# Patient Record
Sex: FEMALE | Race: BLACK OR AFRICAN AMERICAN | Employment: OTHER | URBAN - METROPOLITAN AREA
[De-identification: names, ages, dates, MRNs, and addresses within clinical notes are randomized per-mention and may not be internally consistent; named-entity substitution may affect disease eponyms.]

---

## 2017-04-18 RX ORDER — ASPIRIN 81 MG/1
81 TABLET ORAL DAILY
Qty: 30 TABLET | Refills: 11 | Status: SHIPPED | OUTPATIENT
Start: 2017-04-18 | End: 2017-11-02 | Stop reason: SDUPTHER

## 2017-04-19 RX ORDER — ASPIRIN 81 MG/1
81 TABLET ORAL DAILY
Qty: 30 TABLET | Refills: 11 | Status: SHIPPED | OUTPATIENT
Start: 2017-04-19 | End: 2018-05-14 | Stop reason: SDUPTHER

## 2017-04-19 RX ORDER — CLOPIDOGREL BISULFATE 75 MG/1
75 TABLET ORAL DAILY
Qty: 30 TABLET | Refills: 11 | Status: SHIPPED | OUTPATIENT
Start: 2017-04-19 | End: 2019-08-09 | Stop reason: SDUPTHER

## 2017-04-27 RX ORDER — ASPIRIN 81 MG
TABLET, DELAYED RELEASE (ENTERIC COATED) ORAL
Qty: 30 TABLET | Refills: 0 | Status: SHIPPED | OUTPATIENT
Start: 2017-04-27 | End: 2018-07-10 | Stop reason: SDUPTHER

## 2017-04-27 RX ORDER — CLOPIDOGREL BISULFATE 75 MG/1
75 TABLET ORAL DAILY
Qty: 90 TABLET | Refills: 3 | Status: SHIPPED | OUTPATIENT
Start: 2017-04-27 | End: 2017-11-02 | Stop reason: SDUPTHER

## 2017-10-30 RX ORDER — AMLODIPINE BESYLATE 2.5 MG/1
2.5 TABLET ORAL DAILY
Qty: 90 TABLET | Refills: 3 | Status: SHIPPED | OUTPATIENT
Start: 2017-10-30 | End: 2022-03-03

## 2017-11-02 ENCOUNTER — OFFICE VISIT (OUTPATIENT)
Dept: CARDIOLOGY CLINIC | Age: 82
End: 2017-11-02

## 2017-11-02 VITALS
OXYGEN SATURATION: 95 % | WEIGHT: 210 LBS | HEIGHT: 67 IN | SYSTOLIC BLOOD PRESSURE: 124 MMHG | HEART RATE: 70 BPM | BODY MASS INDEX: 32.96 KG/M2 | DIASTOLIC BLOOD PRESSURE: 74 MMHG

## 2017-11-02 DIAGNOSIS — I25.10 CORONARY ARTERY DISEASE INVOLVING NATIVE CORONARY ARTERY OF NATIVE HEART WITHOUT ANGINA PECTORIS: Primary | ICD-10-CM

## 2017-11-02 PROCEDURE — 1090F PRES/ABSN URINE INCON ASSESS: CPT | Performed by: INTERNAL MEDICINE

## 2017-11-02 PROCEDURE — G8484 FLU IMMUNIZE NO ADMIN: HCPCS | Performed by: INTERNAL MEDICINE

## 2017-11-02 PROCEDURE — G8417 CALC BMI ABV UP PARAM F/U: HCPCS | Performed by: INTERNAL MEDICINE

## 2017-11-02 PROCEDURE — 99214 OFFICE O/P EST MOD 30 MIN: CPT | Performed by: INTERNAL MEDICINE

## 2017-11-02 PROCEDURE — G8598 ASA/ANTIPLAT THER USED: HCPCS | Performed by: INTERNAL MEDICINE

## 2017-11-02 PROCEDURE — 1123F ACP DISCUSS/DSCN MKR DOCD: CPT | Performed by: INTERNAL MEDICINE

## 2017-11-02 PROCEDURE — 1036F TOBACCO NON-USER: CPT | Performed by: INTERNAL MEDICINE

## 2017-11-02 PROCEDURE — 4040F PNEUMOC VAC/ADMIN/RCVD: CPT | Performed by: INTERNAL MEDICINE

## 2017-11-02 PROCEDURE — G8427 DOCREV CUR MEDS BY ELIG CLIN: HCPCS | Performed by: INTERNAL MEDICINE

## 2017-11-02 RX ORDER — ACARBOSE 25 MG/1
25 TABLET ORAL 2 TIMES DAILY
COMMUNITY

## 2017-11-02 RX ORDER — SIMVASTATIN 20 MG
20 TABLET ORAL NIGHTLY
Qty: 90 TABLET | Refills: 3 | Status: SHIPPED | OUTPATIENT
Start: 2017-11-02 | End: 2022-03-03

## 2017-11-02 NOTE — PROGRESS NOTES
Shadia Estes MD        OFFICE  FOLLOWUP NOTE    Chief complaints: patient is here for management of CAD, HTN, DM, DYSLIPIDEMIA    Subjective: patient feels better, no chest pain, no shortness of breath, no dizziness, no palpitations    HARJINDER Diaz is a 80 y. o.year old who  has a past medical history of Allergic rhinitis; CAD (coronary artery disease); Chronic nausea; GERD (gastroesophageal reflux disease); H/O cardiovascular stress test; H/O cardiovascular stress test; H/O cardiovascular stress test; H/O echocardiogram; H/O percutaneous left heart catheterization; H/O right coronary artery stent placement; History of echocardiogram; Hypertension; Intermittent palpitations; Need for pneumococcal vaccine; Obesity (BMI 30-39.9); Other screening mammogram; and Type II or unspecified type diabetes mellitus without mention of complication, not stated as uncontrolled. and presents for management of CAD, HTN, DM, DYSLIPIDEMIA which are well controlled, she had mid LAD PCI few yrs ago, doing fine. Current Outpatient Prescriptions   Medication Sig Dispense Refill    acarbose (PRECOSE) 25 MG tablet Take 25 mg by mouth 2 times daily      amLODIPine (NORVASC) 2.5 MG tablet Take 1 tablet by mouth daily 90 tablet 3    ASPIRIN LOW DOSE 81 MG EC tablet TAKE 1 TABLET BY MOUTH DAILY 30 tablet 0    clopidogrel (PLAVIX) 75 MG tablet Take 1 tablet by mouth daily 30 tablet 11    aspirin (ASPIRIN LOW DOSE) 81 MG EC tablet Take 1 tablet by mouth daily 30 tablet 11    metoprolol tartrate (LOPRESSOR) 25 MG tablet Take 0.25 tablets by mouth 2 times daily 45 tablet 3    simvastatin (ZOCOR) 20 MG tablet Take 1 tablet by mouth nightly 90 tablet 3    Pantoprazole Sodium (PROTONIX) 40 MG PACK packet Take 40 mg by mouth daily      ammonium lactate (LAC-HYDRIN) 12 % lotion Apply  topically as needed for Dry Skin. Apply topically as needed.       valsartan-hydrochlorothiazide (DIOVAN-HCT) 80-12.5 MG per tablet Take 1 dermatitis   Neurologic  alert oriented times 3,Cranial nerves II through XII are grossly intact. There were no gross focal neurologic abnormalities. All sensory and motor nerves examined and were normal  Psychiatric: normal mood and affect    No results found for: CKTOTAL, CKMB, CKMBINDEX, TROPONINI  BNP:  No results found for: BNP  PT/INR:  No results found for: PTINR  Lab Results   Component Value Date    LABA1C 6.6 (H) 06/17/2015    LABA1C 5.9 11/20/2012     Lab Results   Component Value Date    CHOL 138 06/17/2015    TRIG 87 06/17/2015    HDL 59 06/17/2015    LDLDIRECT 66 06/17/2015     Lab Results   Component Value Date    ALT 19 11/20/2012    AST 23 11/20/2012     TSH:  No results found for: TSH    Impression:  Zeb Severs is a 80 y. o.year old who  has a past medical history of Allergic rhinitis; CAD (coronary artery disease); Chronic nausea; GERD (gastroesophageal reflux disease); H/O cardiovascular stress test; H/O cardiovascular stress test; H/O cardiovascular stress test; H/O echocardiogram; H/O percutaneous left heart catheterization; H/O right coronary artery stent placement; History of echocardiogram; Hypertension; Intermittent palpitations; Need for pneumococcal vaccine; Obesity (BMI 30-39.9); Other screening mammogram; and Type II or unspecified type diabetes mellitus without mention of complication, not stated as uncontrolled. and presents with     Plan:  1. CAD: Continue aspirin and plavix for atleast one yr, continue statins,betablockers, repeat eho  2. DM: stable continue glipizide  3. Dyslipidemia: continue statins. Refills provided  4. HTN: stable, continue lopressor, norvasc and HCTZ,medicatons  5. Health maintenance: exerise and dietHealth maintenance: exerise and diet  All labs, medications and tests reviewed, continue all other medications of all above medical condition listed as is.     @[email or web address]@

## 2018-01-10 ENCOUNTER — HOSPITAL ENCOUNTER (OUTPATIENT)
Dept: GENERAL RADIOLOGY | Age: 83
Discharge: HOME | End: 2018-01-10
Attending: INTERNAL MEDICINE | Admitting: INTERNAL MEDICINE

## 2018-01-10 LAB
ALBUMIN SERPL-MCNC: 4.3 GM/DL (ref 3.4–5)
ANION GAP SERPL CALCULATED.3IONS-SCNC: 16 MMOL/L (ref 4–16)
BACTERIA: ABNORMAL /HPF
BILIRUBIN URINE: NEGATIVE MG/DL
BLOOD, URINE: NEGATIVE
BUN BLDV-MCNC: 21 MG/DL (ref 6–23)
CALCIUM SERPL-MCNC: 10 MG/DL (ref 8.3–10.6)
CHLORIDE BLD-SCNC: 104 MMOL/L (ref 99–110)
CHOLESTEROL: 155 MG/DL
CLARITY: ABNORMAL
CO2: 26 MMOL/L (ref 21–32)
COLOR: YELLOW
CREAT SERPL-MCNC: 1.2 MG/DL (ref 0.6–1.1)
ESTIMATED AVERAGE GLUCOSE: 166 MG/DL
GFR AFRICAN AMERICAN: 51 ML/MIN/1.73M2
GFR NON-AFRICAN AMERICAN: 42 ML/MIN/1.73M2
GLUCOSE BLD-MCNC: 152 MG/DL (ref 70–99)
GLUCOSE, URINE: NEGATIVE MG/DL
HBA1C MFR BLD: 7.4 % (ref 4.2–6.3)
HDLC SERPL-MCNC: 66 MG/DL
KETONES, URINE: NEGATIVE MG/DL
LDL CHOLESTEROL DIRECT: 78 MG/DL
LEUKOCYTE ESTERASE, URINE: ABNORMAL
MUCUS: ABNORMAL HPF
NITRITE URINE, QUANTITATIVE: NEGATIVE
PH, URINE: 5 (ref 5–8)
PHOSPHORUS: 3.1 MG/DL (ref 2.5–4.9)
POTASSIUM SERPL-SCNC: 4.4 MMOL/L (ref 3.5–5.1)
PROTEIN UA: NEGATIVE MG/DL
RBC URINE: 1 /HPF (ref 0–6)
SODIUM BLD-SCNC: 146 MMOL/L (ref 135–145)
SPECIFIC GRAVITY UA: 1.02 (ref 1–1.03)
SQUAMOUS EPITHELIAL: 1 /HPF
TRANSITIONAL EPITHELIAL: <1 /HPF
TRICHOMONAS: ABNORMAL /HPF
TRIGL SERPL-MCNC: 89 MG/DL
UROBILINOGEN, URINE: NORMAL MG/DL (ref 0.2–1)
WBC UA: 4 /HPF (ref 0–5)

## 2018-05-14 ENCOUNTER — OFFICE VISIT (OUTPATIENT)
Dept: CARDIOLOGY CLINIC | Age: 83
End: 2018-05-14

## 2018-05-14 VITALS
SYSTOLIC BLOOD PRESSURE: 124 MMHG | HEART RATE: 60 BPM | HEIGHT: 67 IN | DIASTOLIC BLOOD PRESSURE: 70 MMHG | BODY MASS INDEX: 32.83 KG/M2 | WEIGHT: 209.2 LBS

## 2018-05-14 DIAGNOSIS — I25.10 CORONARY ARTERY DISEASE INVOLVING NATIVE CORONARY ARTERY OF NATIVE HEART WITHOUT ANGINA PECTORIS: Primary | ICD-10-CM

## 2018-05-14 PROCEDURE — G8417 CALC BMI ABV UP PARAM F/U: HCPCS | Performed by: INTERNAL MEDICINE

## 2018-05-14 PROCEDURE — 99214 OFFICE O/P EST MOD 30 MIN: CPT | Performed by: INTERNAL MEDICINE

## 2018-05-14 PROCEDURE — 1123F ACP DISCUSS/DSCN MKR DOCD: CPT | Performed by: INTERNAL MEDICINE

## 2018-05-14 PROCEDURE — 4040F PNEUMOC VAC/ADMIN/RCVD: CPT | Performed by: INTERNAL MEDICINE

## 2018-05-14 PROCEDURE — G8598 ASA/ANTIPLAT THER USED: HCPCS | Performed by: INTERNAL MEDICINE

## 2018-05-14 PROCEDURE — 1090F PRES/ABSN URINE INCON ASSESS: CPT | Performed by: INTERNAL MEDICINE

## 2018-05-14 PROCEDURE — 1036F TOBACCO NON-USER: CPT | Performed by: INTERNAL MEDICINE

## 2018-05-14 PROCEDURE — G8427 DOCREV CUR MEDS BY ELIG CLIN: HCPCS | Performed by: INTERNAL MEDICINE

## 2018-05-14 RX ORDER — ASPIRIN 81 MG/1
81 TABLET ORAL DAILY
Qty: 30 TABLET | Refills: 5 | Status: SHIPPED | OUTPATIENT
Start: 2018-05-14 | End: 2018-11-20 | Stop reason: SDUPTHER

## 2018-11-20 ENCOUNTER — OFFICE VISIT (OUTPATIENT)
Dept: CARDIOLOGY CLINIC | Age: 83
End: 2018-11-20
Payer: MEDICARE

## 2018-11-20 VITALS
HEIGHT: 66 IN | DIASTOLIC BLOOD PRESSURE: 78 MMHG | HEART RATE: 66 BPM | WEIGHT: 197.2 LBS | SYSTOLIC BLOOD PRESSURE: 134 MMHG | BODY MASS INDEX: 31.69 KG/M2

## 2018-11-20 DIAGNOSIS — R06.02 SHORTNESS OF BREATH: Primary | ICD-10-CM

## 2018-11-20 PROCEDURE — 99214 OFFICE O/P EST MOD 30 MIN: CPT | Performed by: INTERNAL MEDICINE

## 2018-11-20 PROCEDURE — 1090F PRES/ABSN URINE INCON ASSESS: CPT | Performed by: INTERNAL MEDICINE

## 2018-11-20 PROCEDURE — G8417 CALC BMI ABV UP PARAM F/U: HCPCS | Performed by: INTERNAL MEDICINE

## 2018-11-20 PROCEDURE — G8484 FLU IMMUNIZE NO ADMIN: HCPCS | Performed by: INTERNAL MEDICINE

## 2018-11-20 PROCEDURE — G8598 ASA/ANTIPLAT THER USED: HCPCS | Performed by: INTERNAL MEDICINE

## 2018-11-20 PROCEDURE — 1123F ACP DISCUSS/DSCN MKR DOCD: CPT | Performed by: INTERNAL MEDICINE

## 2018-11-20 PROCEDURE — G8427 DOCREV CUR MEDS BY ELIG CLIN: HCPCS | Performed by: INTERNAL MEDICINE

## 2018-11-20 PROCEDURE — 1101F PT FALLS ASSESS-DOCD LE1/YR: CPT | Performed by: INTERNAL MEDICINE

## 2018-11-20 PROCEDURE — 4040F PNEUMOC VAC/ADMIN/RCVD: CPT | Performed by: INTERNAL MEDICINE

## 2018-11-20 PROCEDURE — 1036F TOBACCO NON-USER: CPT | Performed by: INTERNAL MEDICINE

## 2018-11-20 RX ORDER — ASPIRIN 81 MG/1
81 TABLET ORAL DAILY
Qty: 30 TABLET | Refills: 5 | Status: SHIPPED | OUTPATIENT
Start: 2018-11-20 | End: 2019-06-25 | Stop reason: SDUPTHER

## 2018-11-20 RX ORDER — LOSARTAN POTASSIUM AND HYDROCHLOROTHIAZIDE 12.5; 5 MG/1; MG/1
1 TABLET ORAL DAILY
COMMUNITY
End: 2019-05-20

## 2018-11-29 ENCOUNTER — PROCEDURE VISIT (OUTPATIENT)
Dept: CARDIOLOGY CLINIC | Age: 83
End: 2018-11-29
Payer: MEDICARE

## 2018-11-29 DIAGNOSIS — R06.02 SHORTNESS OF BREATH: Primary | ICD-10-CM

## 2018-11-29 LAB
LV EF: 58 %
LVEF MODALITY: NORMAL

## 2018-11-29 PROCEDURE — 93306 TTE W/DOPPLER COMPLETE: CPT | Performed by: INTERNAL MEDICINE

## 2018-12-03 ENCOUNTER — TELEPHONE (OUTPATIENT)
Dept: CARDIOLOGY CLINIC | Age: 83
End: 2018-12-03

## 2019-03-20 ENCOUNTER — HOSPITAL ENCOUNTER (OUTPATIENT)
Age: 84
Discharge: HOME | End: 2019-03-20
Payer: MEDICARE

## 2019-03-20 LAB
ALBUMIN SERPL-MCNC: 3.9 GM/DL (ref 3.4–5)
ANION GAP SERPL CALCULATED.3IONS-SCNC: 11 MMOL/L (ref 4–16)
BACTERIA: NEGATIVE /HPF
BILIRUBIN URINE: NEGATIVE MG/DL
BLOOD, URINE: NEGATIVE
BUN BLDV-MCNC: 22 MG/DL (ref 6–23)
CALCIUM SERPL-MCNC: 9.5 MG/DL (ref 8.3–10.6)
CHLORIDE BLD-SCNC: 103 MMOL/L (ref 99–110)
CLARITY: CLEAR
CO2: 27 MMOL/L (ref 21–32)
COLOR: YELLOW
CREAT SERPL-MCNC: 1.1 MG/DL (ref 0.6–1.1)
GFR AFRICAN AMERICAN: 56 ML/MIN/1.73M2
GFR NON-AFRICAN AMERICAN: 47 ML/MIN/1.73M2
GLUCOSE BLD-MCNC: 127 MG/DL (ref 70–99)
GLUCOSE, URINE: NEGATIVE MG/DL
KETONES, URINE: NEGATIVE MG/DL
LEUKOCYTE ESTERASE, URINE: NEGATIVE
MUCUS: ABNORMAL HPF
NITRITE URINE, QUANTITATIVE: NEGATIVE
PH, URINE: 5 (ref 5–8)
PHOSPHORUS: 3.1 MG/DL (ref 2.5–4.9)
POTASSIUM SERPL-SCNC: 4.2 MMOL/L (ref 3.5–5.1)
PROTEIN UA: 30 MG/DL
RBC URINE: <1 /HPF (ref 0–6)
SODIUM BLD-SCNC: 141 MMOL/L (ref 135–145)
SPECIFIC GRAVITY UA: 1.01 (ref 1–1.03)
TRICHOMONAS: ABNORMAL /HPF
UROBILINOGEN, URINE: NORMAL MG/DL (ref 0.2–1)
WBC UA: 1 /HPF (ref 0–5)

## 2019-03-20 PROCEDURE — 81001 URINALYSIS AUTO W/SCOPE: CPT

## 2019-03-20 PROCEDURE — 36415 COLL VENOUS BLD VENIPUNCTURE: CPT

## 2019-03-20 PROCEDURE — 80069 RENAL FUNCTION PANEL: CPT

## 2019-04-09 ENCOUNTER — HOSPITAL ENCOUNTER (OUTPATIENT)
Age: 84
Discharge: HOME | End: 2019-04-09
Payer: MEDICARE

## 2019-04-09 LAB — TSH HIGH SENSITIVITY: 2.83 UIU/ML (ref 0.27–4.2)

## 2019-04-09 PROCEDURE — 36415 COLL VENOUS BLD VENIPUNCTURE: CPT

## 2019-04-09 PROCEDURE — 84436 ASSAY OF TOTAL THYROXINE: CPT

## 2019-04-09 PROCEDURE — 84443 ASSAY THYROID STIM HORMONE: CPT

## 2019-04-12 LAB
T4 TOTAL: 8.98 UG/DL (ref 5.1–14.1)
T4 TOTAL: NORMAL UG/DL (ref 5.1–14.1)

## 2019-05-20 ENCOUNTER — OFFICE VISIT (OUTPATIENT)
Dept: CARDIOLOGY CLINIC | Age: 84
End: 2019-05-20
Payer: MEDICARE

## 2019-05-20 VITALS
HEIGHT: 66 IN | DIASTOLIC BLOOD PRESSURE: 82 MMHG | WEIGHT: 190 LBS | BODY MASS INDEX: 30.53 KG/M2 | HEART RATE: 64 BPM | SYSTOLIC BLOOD PRESSURE: 130 MMHG

## 2019-05-20 DIAGNOSIS — I25.10 CORONARY ARTERY DISEASE INVOLVING NATIVE CORONARY ARTERY OF NATIVE HEART WITHOUT ANGINA PECTORIS: Primary | ICD-10-CM

## 2019-05-20 PROCEDURE — G8598 ASA/ANTIPLAT THER USED: HCPCS | Performed by: INTERNAL MEDICINE

## 2019-05-20 PROCEDURE — G8427 DOCREV CUR MEDS BY ELIG CLIN: HCPCS | Performed by: INTERNAL MEDICINE

## 2019-05-20 PROCEDURE — G8417 CALC BMI ABV UP PARAM F/U: HCPCS | Performed by: INTERNAL MEDICINE

## 2019-05-20 PROCEDURE — 1123F ACP DISCUSS/DSCN MKR DOCD: CPT | Performed by: INTERNAL MEDICINE

## 2019-05-20 PROCEDURE — 99214 OFFICE O/P EST MOD 30 MIN: CPT | Performed by: INTERNAL MEDICINE

## 2019-05-20 PROCEDURE — 1036F TOBACCO NON-USER: CPT | Performed by: INTERNAL MEDICINE

## 2019-05-20 PROCEDURE — 4040F PNEUMOC VAC/ADMIN/RCVD: CPT | Performed by: INTERNAL MEDICINE

## 2019-05-20 PROCEDURE — 1090F PRES/ABSN URINE INCON ASSESS: CPT | Performed by: INTERNAL MEDICINE

## 2019-05-20 RX ORDER — METOPROLOL SUCCINATE 25 MG/1
25 TABLET, EXTENDED RELEASE ORAL DAILY
COMMUNITY

## 2019-05-20 RX ORDER — OXYBUTYNIN CHLORIDE 5 MG/1
5 TABLET ORAL 3 TIMES DAILY
COMMUNITY

## 2019-05-20 NOTE — PROGRESS NOTES
Kenan Ramirez MD        OFFICE  FOLLOWUP NOTE    Chief complaints: patient is here for management of CAD, HTN, DM, DYSLIPIDEMIA      Subjective: patient feels tired, her medications were changed as she was sweating     HPI Marthenia Schwab is a 80 y. o.year old who  has a past medical history of Allergic rhinitis, CAD (coronary artery disease), Chronic nausea, GERD (gastroesophageal reflux disease), H/O cardiovascular stress test, H/O cardiovascular stress test, H/O cardiovascular stress test, H/O echocardiogram, H/O echocardiogram, H/O percutaneous left heart catheterization, H/O right coronary artery stent placement, History of echocardiogram, Hypertension, Intermittent palpitations, Need for pneumococcal vaccine, Obesity (BMI 30-39.9), Other screening mammogram, and Type II or unspecified type diabetes mellitus without mention of complication, not stated as uncontrolled. and presents for management of CAD, HTN, DM, DYSLIPIDEMIA which are well controlled  Her lopressor was changed to xl and her hyzaar was stopped. she lost 20 lbs, and her glipizide was stopped. Current Outpatient Medications   Medication Sig Dispense Refill    metoprolol succinate (TOPROL XL) 25 MG extended release tablet Take 25 mg by mouth daily      oxybutynin (DITROPAN) 5 MG tablet Take 5 mg by mouth 3 times daily      Omega-3 Fatty Acids (OMEGA 3 500 PO) Take by mouth      Probiotic Product (PROBIOTIC DAILY PO) Take by mouth      aspirin (ASPIRIN LOW DOSE) 81 MG EC tablet Take 1 tablet by mouth daily 30 tablet 5    acarbose (PRECOSE) 25 MG tablet Take 25 mg by mouth 2 times daily      simvastatin (ZOCOR) 20 MG tablet Take 1 tablet by mouth nightly 90 tablet 3    clopidogrel (PLAVIX) 75 MG tablet Take 1 tablet by mouth daily 30 tablet 11    pantoprazole (PROTONIX) 40 MG tablet Take 40 mg by mouth daily.  traMADol (ULTRAM) 50 MG tablet Take 50 mg by mouth every 8 hours as needed for Pain.  Cecilia Magallanes Cholecalciferol (VITAMIN D3) 5000 UNITS TABS Take 1 each by mouth daily       magnesium oxide (MAG-OX) 400 MG tablet Take 400 mg by mouth daily.  naproxen (NAPROSYN) 500 MG tablet Take 1 tablet by mouth 2 times daily as needed for Pain 20 tablet 0    amLODIPine (NORVASC) 2.5 MG tablet Take 1 tablet by mouth daily (Patient taking differently: Take 5 mg by mouth daily ) 90 tablet 3    Glucose Blood (BLOOD GLUCOSE TEST STRIPS) STRP Test strips for Contour glucometer. Tests once daily. 50 strip 6     No current facility-administered medications for this visit. Allergies: Codeine; Demerol; Hydrocodone-acetaminophen; Iodine; Metformin; Metformin and related [metformin and related]; Vicodin [hydrocodone-acetaminophen]; Bactrim; and Dexlansoprazole  Past Medical History:   Diagnosis Date    Allergic rhinitis     CAD (coronary artery disease)     Chronic nausea     seen GI in the past with EGD that was normal     GERD (gastroesophageal reflux disease)     H/O cardiovascular stress test 11/19/2014    moderate to sverere ischemia apical inferior, transient ischemic dilation ,EF70%    H/O cardiovascular stress test 1/28/2015    cardiolite-normal,EF70%    H/O cardiovascular stress test 4/16/2015    lexiscan-normal,EF70%    H/O echocardiogram 11/19/14    EF 55%.  H/O echocardiogram 11/29/2018    EF: 55-60% Mild AR, TR, Mild pulm HTN at 37 mmHg     H/O percutaneous left heart catheterization 12/10/14    Left ventricular end diastolic pressure was 1 mmHg, Left ventriculography revealed an,  2. EF around 50%, The left main is normal, The left anterior descending artery has 99%  mid segment stenosis and mildly aneursymal appearance. The left circumflex is mild  disease, The right coronary artery is mild disease    H/O right coronary artery stent placement 12/10/14    Successful stenting of the mid LAD with reduction of stenosis from 99% to 0%    History of echocardiogram 11/03/2016     EF 50-60%·. This 2D echocardiogram is normal, times 3,Cranial nerves II through XII are grossly intact. There were no gross focal neurologic abnormalities. All sensory and motor nerves examined and were normal  Psychiatric: normal mood and affect    No results found for: CKTOTAL, CKMB, CKMBINDEX, TROPONINI  BNP:  No results found for: BNP  PT/INR:  No results found for: PTINR  Lab Results   Component Value Date    LABA1C 7.4 (H) 01/10/2018    LABA1C 6.6 (H) 06/17/2015     Lab Results   Component Value Date    CHOL 155 01/10/2018    TRIG 89 01/10/2018    HDL 66 01/10/2018    LDLDIRECT 78 01/10/2018     Lab Results   Component Value Date    ALT 19 11/20/2012    AST 23 11/20/2012     TSH:  No results found for: TSH    Impression:  Simona Epperson is a 80 y. o.year old who  has a past medical history of Allergic rhinitis, CAD (coronary artery disease), Chronic nausea, GERD (gastroesophageal reflux disease), H/O cardiovascular stress test, H/O cardiovascular stress test, H/O cardiovascular stress test, H/O echocardiogram, H/O echocardiogram, H/O percutaneous left heart catheterization, H/O right coronary artery stent placement, History of echocardiogram, Hypertension, Intermittent palpitations, Need for pneumococcal vaccine, Obesity (BMI 30-39.9), Other screening mammogram, and Type II or unspecified type diabetes mellitus without mention of complication, not stated as uncontrolled. and presents with     Plan:  1. CAD: Continue aspirin and plavix , continue statins,betablockers,   2. Shortness of breath  3. DM: stable. Stopped  Glipizide on acarbose  4. Dyslipidemia: continue statins. 5. HTN: stable, continue lopressor, norvasc and off hyzaar   6.  Sweating: she believed oxybutinin is causing her to sweat, will recommend to stop for one week and follow up

## 2019-05-30 ENCOUNTER — OFFICE VISIT (OUTPATIENT)
Dept: CARDIOLOGY CLINIC | Age: 84
End: 2019-05-30
Payer: MEDICARE

## 2019-05-30 VITALS
WEIGHT: 193.2 LBS | DIASTOLIC BLOOD PRESSURE: 76 MMHG | SYSTOLIC BLOOD PRESSURE: 130 MMHG | BODY MASS INDEX: 31.05 KG/M2 | HEART RATE: 64 BPM | HEIGHT: 66 IN

## 2019-05-30 DIAGNOSIS — I25.10 CORONARY ARTERY DISEASE INVOLVING NATIVE CORONARY ARTERY OF NATIVE HEART WITHOUT ANGINA PECTORIS: Primary | ICD-10-CM

## 2019-05-30 PROCEDURE — G8427 DOCREV CUR MEDS BY ELIG CLIN: HCPCS | Performed by: INTERNAL MEDICINE

## 2019-05-30 PROCEDURE — 1090F PRES/ABSN URINE INCON ASSESS: CPT | Performed by: INTERNAL MEDICINE

## 2019-05-30 PROCEDURE — 99214 OFFICE O/P EST MOD 30 MIN: CPT | Performed by: INTERNAL MEDICINE

## 2019-05-30 PROCEDURE — G8598 ASA/ANTIPLAT THER USED: HCPCS | Performed by: INTERNAL MEDICINE

## 2019-05-30 PROCEDURE — 4040F PNEUMOC VAC/ADMIN/RCVD: CPT | Performed by: INTERNAL MEDICINE

## 2019-05-30 PROCEDURE — 1123F ACP DISCUSS/DSCN MKR DOCD: CPT | Performed by: INTERNAL MEDICINE

## 2019-05-30 PROCEDURE — 1036F TOBACCO NON-USER: CPT | Performed by: INTERNAL MEDICINE

## 2019-05-30 PROCEDURE — G8417 CALC BMI ABV UP PARAM F/U: HCPCS | Performed by: INTERNAL MEDICINE

## 2019-05-30 NOTE — PROGRESS NOTES
Kenan Ramirez MD        OFFICE  FOLLOWUP NOTE    Chief complaints: patient is here for management of CAD, HTN, DM, DYSLIPIDEMIA         Subjective: patient feels better, no chest pain, no shortness of breath, no dizziness, no palpitations    HPI Marthenia Schwab is a 80 y. o.year old who  has a past medical history of Allergic rhinitis, CAD (coronary artery disease), Chronic nausea, GERD (gastroesophageal reflux disease), H/O cardiovascular stress test, H/O cardiovascular stress test, H/O cardiovascular stress test, H/O echocardiogram, H/O echocardiogram, H/O percutaneous left heart catheterization, H/O right coronary artery stent placement, History of echocardiogram, Hypertension, Intermittent palpitations, Need for pneumococcal vaccine, Obesity (BMI 30-39.9), Other screening mammogram, and Type II or unspecified type diabetes mellitus without mention of complication, not stated as uncontrolled.  and presents for management of CAD, HTN, DM, DYSLIPIDEMIA which are well controlled  Her sweating is better since she stopped oxybutinin    Current Outpatient Medications   Medication Sig Dispense Refill    metoprolol succinate (TOPROL XL) 25 MG extended release tablet Take 25 mg by mouth daily      oxybutynin (DITROPAN) 5 MG tablet Take 5 mg by mouth 3 times daily      Omega-3 Fatty Acids (OMEGA 3 500 PO) Take by mouth      Probiotic Product (PROBIOTIC DAILY PO) Take by mouth      aspirin (ASPIRIN LOW DOSE) 81 MG EC tablet Take 1 tablet by mouth daily 30 tablet 5    naproxen (NAPROSYN) 500 MG tablet Take 1 tablet by mouth 2 times daily as needed for Pain 20 tablet 0    acarbose (PRECOSE) 25 MG tablet Take 25 mg by mouth 2 times daily      simvastatin (ZOCOR) 20 MG tablet Take 1 tablet by mouth nightly 90 tablet 3    amLODIPine (NORVASC) 2.5 MG tablet Take 1 tablet by mouth daily (Patient taking differently: Take 5 mg by mouth daily ) 90 tablet 3    clopidogrel (PLAVIX) 75 MG tablet Take 1 tablet by mouth daily 30 tablet 11    pantoprazole (PROTONIX) 40 MG tablet Take 40 mg by mouth daily.  traMADol (ULTRAM) 50 MG tablet Take 50 mg by mouth every 8 hours as needed for Pain. Stephania Zeng Cholecalciferol (VITAMIN D3) 5000 UNITS TABS Take 1 each by mouth daily       magnesium oxide (MAG-OX) 400 MG tablet Take 400 mg by mouth daily.  Glucose Blood (BLOOD GLUCOSE TEST STRIPS) STRP Test strips for Contour glucometer. Tests once daily. 50 strip 6     No current facility-administered medications for this visit. Allergies: Codeine; Demerol; Hydrocodone-acetaminophen; Iodine; Metformin; Metformin and related [metformin and related]; Vicodin [hydrocodone-acetaminophen]; Bactrim; and Dexlansoprazole  Past Medical History:   Diagnosis Date    Allergic rhinitis     CAD (coronary artery disease)     Chronic nausea     seen GI in the past with EGD that was normal     GERD (gastroesophageal reflux disease)     H/O cardiovascular stress test 11/19/2014    moderate to sverere ischemia apical inferior, transient ischemic dilation ,EF70%    H/O cardiovascular stress test 1/28/2015    cardiolite-normal,EF70%    H/O cardiovascular stress test 4/16/2015    lexiscan-normal,EF70%    H/O echocardiogram 11/19/14    EF 55%.  H/O echocardiogram 11/29/2018    EF: 55-60% Mild AR, TR, Mild pulm HTN at 37 mmHg     H/O percutaneous left heart catheterization 12/10/14    Left ventricular end diastolic pressure was 1 mmHg, Left ventriculography revealed an,  2. EF around 50%, The left main is normal, The left anterior descending artery has 99%  mid segment stenosis and mildly aneursymal appearance. The left circumflex is mild  disease, The right coronary artery is mild disease    H/O right coronary artery stent placement 12/10/14    Successful stenting of the mid LAD with reduction of stenosis from 99% to 0%    History of echocardiogram 11/03/2016     EF 50-60%·. This 2D echocardiogram is normal, except for mild LVH     Hypertension     Intermittent palpitations     Need for pneumococcal vaccine     last screen  2004 and had stopped screening    Obesity (BMI 30-39. 9)     Other screening mammogram     Type II or unspecified type diabetes mellitus without mention of complication, not stated as uncontrolled     April 2011 Dr. Mauricio Guerrero- normal DM exam, podiatry Dr. Pao Fulton- seen yearly     Past Surgical History:   Procedure Laterality Date    CARDIAC CATHETERIZATION  12/10/14    Left ventricular end diastolic pressure was 1 mmHg, Left ventriculography revealed an,  2. EF around 50%, The left main is normal, The left anterior descending artery has 99%  mid segment stenosis and mildly aneursymal appearance.  The left circumflex is mild  disease, The right coronary artery is mild disease    CATARACT REMOVAL  4/2011    no diabetic retinopathy; due in 1 year    CORONARY ANGIOPLASTY WITH STENT PLACEMENT  12/10/14    Successful stenting of the mid LAD with reduction of stenosis from 99% to 0%    EYE SURGERY      left eye    KNEE ARTHROSCOPY      SHOULDER SURGERY Right     TONSILLECTOMY       Family History   Problem Relation Age of Onset    Cancer Sister     Heart Attack Brother      Social History     Tobacco Use    Smoking status: Never Smoker    Smokeless tobacco: Never Used   Substance Use Topics    Alcohol use: No     Alcohol/week: 0.0 oz      @[employer]@  Review of Systems:   · Constitutional: No Fever or Weight Loss   · Eyes: No Decreased Vision  · ENT: No Headaches, Hearing Loss or Vertigo  · Cardiovascular: No chest pain, dyspnea on exertion, palpitations or loss of consciousness  · Respiratory: No cough or wheezing    · Gastrointestinal: No abdominal pain, appetite loss, blood in stools, constipation, diarrhea or heartburn  · Genitourinary: No dysuria, trouble voiding, or hematuria  · Musculoskeletal:  No gait disturbance, weakness or joint complaints  · Integumentary: No rash or pruritis  · Neurological: No TIA or stroke symptoms  · Psychiatric: No anxiety or depression  · Endocrine: No malaise, fatigue or temperature intolerance  · Hematologic/Lymphatic: No bleeding problems, blood clots or swollen lymph nodes  · Allergic/Immunologic: No nasal congestion or hives  All systems negative except as marked. Objective:  /76 (Site: Right Upper Arm, Position: Sitting, Cuff Size: Medium Adult)   Pulse 64   Ht 5' 6\" (1.676 m)   Wt 193 lb 3.2 oz (87.6 kg)   BMI 31.18 kg/m²   Wt Readings from Last 3 Encounters:   05/30/19 193 lb 3.2 oz (87.6 kg)   05/20/19 190 lb (86.2 kg)   11/20/18 197 lb 3.2 oz (89.4 kg)     Body mass index is 31.18 kg/m². GENERAL - Alert, oriented, pleasant, in no apparent distress,normal grooming  HEENT - pupils are reactive to light and accomodation, cornea intact, conjunctive normal color, ears are normal in exam,throat exam in normal, teeth, gum and palate are normal, oral mucosa is normal without any notation of pallor or cyanosis  Neck - Supple. No jugular venous distention noted. No carotid bruits, no apical -carotid delay  Respiratory:  Normal breath sounds, No respiratory distress, No wheezing, No chest tenderness. ,no use of accessory muscles, diaphragm movement is normal  Cardiovascular: (PMI) apex non displaced,no lifts no thrills, no s3,no s4, Normal heart rate, Normal rhythm, No murmurs, No rubs, No gallops.  Carotid arteries pulse and amplitude are normal no bruit, no abdominal bruit noted ( normal abdominal aorta ausculation), femoral arteries pulse and amplitude are normal no bruit, pedal pulses are normal  Femoral pulses have normal amplitude, no bruits   Extremities - No cyanosis, clubbing, or significant edema, no varicose veins    Abdomen - No masses, tenderness, or organomegaly, no hepato-splenomegally, no bruits  Musculoskeletal - No significant edema, no kyphosis or scoliosis, no deformity in any extremity noted, muscle strength and tone are normal  Skin: no ulcer,no scar,no stasis dermatitis   Neurologic - alert oriented times 3,Cranial nerves II through XII are grossly intact. There were no gross focal neurologic abnormalities. All sensory and motor nerves examined and were normal  Psychiatric: normal mood and affect    No results found for: CKTOTAL, CKMB, CKMBINDEX, TROPONINI  BNP:  No results found for: BNP  PT/INR:  No results found for: PTINR  Lab Results   Component Value Date    LABA1C 7.4 (H) 01/10/2018    LABA1C 6.6 (H) 06/17/2015     Lab Results   Component Value Date    CHOL 155 01/10/2018    TRIG 89 01/10/2018    HDL 66 01/10/2018    LDLDIRECT 78 01/10/2018     Lab Results   Component Value Date    ALT 19 11/20/2012    AST 23 11/20/2012     TSH:  No results found for: TSH    Impression:  Simona Epperson is a 80 y. o.year old who  has a past medical history of Allergic rhinitis, CAD (coronary artery disease), Chronic nausea, GERD (gastroesophageal reflux disease), H/O cardiovascular stress test, H/O cardiovascular stress test, H/O cardiovascular stress test, H/O echocardiogram, H/O echocardiogram, H/O percutaneous left heart catheterization, H/O right coronary artery stent placement, History of echocardiogram, Hypertension, Intermittent palpitations, Need for pneumococcal vaccine, Obesity (BMI 30-39.9), Other screening mammogram, and Type II or unspecified type diabetes mellitus without mention of complication, not stated as uncontrolled. and presents with     Plan:  1. CAD: Continue aspirin and plavix , continue statins,betablockers,   2. Shortness of breath  3. DM: stable. Stopped  Glipizide on acarbose  4. Dyslipidemia: continue statins. 5. HTN: stable, continue lopressor, norvasc and off hyzaar  6. Sweating: better since she stopped oxybutinin,    All labs, medications and tests reviewed, continue all other medications of all above medical condition listed as is.     [unfilled]

## 2019-06-25 RX ORDER — ASPIRIN 81 MG/1
81 TABLET ORAL DAILY
Qty: 30 TABLET | Refills: 5 | Status: SHIPPED | OUTPATIENT
Start: 2019-06-25

## 2019-08-06 ENCOUNTER — HOSPITAL ENCOUNTER (OUTPATIENT)
Age: 84
Discharge: HOME | End: 2019-08-06
Payer: MEDICARE

## 2019-08-06 LAB
ALBUMIN SERPL-MCNC: 4.1 GM/DL (ref 3.4–5)
ANION GAP SERPL CALCULATED.3IONS-SCNC: 10 MMOL/L (ref 4–16)
BUN BLDV-MCNC: 21 MG/DL (ref 6–23)
CALCIUM SERPL-MCNC: 10 MG/DL (ref 8.3–10.6)
CHLORIDE BLD-SCNC: 105 MMOL/L (ref 99–110)
CO2: 26 MMOL/L (ref 21–32)
CREAT SERPL-MCNC: 1.2 MG/DL (ref 0.6–1.1)
GFR AFRICAN AMERICAN: 51 ML/MIN/1.73M2
GFR NON-AFRICAN AMERICAN: 42 ML/MIN/1.73M2
GLUCOSE BLD-MCNC: 129 MG/DL (ref 70–99)
PHOSPHORUS: 3.5 MG/DL (ref 2.5–4.9)
POTASSIUM SERPL-SCNC: 4.1 MMOL/L (ref 3.5–5.1)
SODIUM BLD-SCNC: 141 MMOL/L (ref 135–145)

## 2019-08-06 PROCEDURE — 36415 COLL VENOUS BLD VENIPUNCTURE: CPT

## 2019-08-06 PROCEDURE — 80069 RENAL FUNCTION PANEL: CPT

## 2019-08-09 RX ORDER — SIMVASTATIN 20 MG
20 TABLET ORAL NIGHTLY
Qty: 90 TABLET | Refills: 3 | Status: CANCELLED | OUTPATIENT
Start: 2019-08-09 | End: 2021-03-05

## 2019-08-09 RX ORDER — NAPROXEN 500 MG/1
500 TABLET ORAL 2 TIMES DAILY PRN
Qty: 20 TABLET | Refills: 0 | Status: CANCELLED | OUTPATIENT
Start: 2019-08-09 | End: 2020-06-28

## 2019-08-09 RX ORDER — AMLODIPINE BESYLATE 2.5 MG/1
2.5 TABLET ORAL DAILY
Qty: 90 TABLET | Refills: 3 | Status: CANCELLED | OUTPATIENT
Start: 2019-08-09 | End: 2021-03-08

## 2019-08-09 RX ORDER — METOPROLOL SUCCINATE 25 MG/1
25 TABLET, EXTENDED RELEASE ORAL DAILY
Status: CANCELLED | OUTPATIENT
Start: 2019-08-09

## 2019-08-09 RX ORDER — CLOPIDOGREL BISULFATE 75 MG/1
75 TABLET ORAL DAILY
Qty: 90 TABLET | Refills: 3 | Status: SHIPPED | OUTPATIENT
Start: 2019-08-09 | End: 2020-02-28

## 2019-08-27 ENCOUNTER — OFFICE VISIT (OUTPATIENT)
Dept: CARDIOLOGY CLINIC | Age: 84
End: 2019-08-27
Payer: MEDICARE

## 2019-08-27 VITALS
SYSTOLIC BLOOD PRESSURE: 112 MMHG | BODY MASS INDEX: 30.7 KG/M2 | DIASTOLIC BLOOD PRESSURE: 68 MMHG | HEART RATE: 60 BPM | WEIGHT: 191 LBS | HEIGHT: 66 IN

## 2019-08-27 DIAGNOSIS — M79.89 LEG SWELLING: Primary | ICD-10-CM

## 2019-08-27 PROCEDURE — G8428 CUR MEDS NOT DOCUMENT: HCPCS | Performed by: INTERNAL MEDICINE

## 2019-08-27 PROCEDURE — G8598 ASA/ANTIPLAT THER USED: HCPCS | Performed by: INTERNAL MEDICINE

## 2019-08-27 PROCEDURE — 99214 OFFICE O/P EST MOD 30 MIN: CPT | Performed by: INTERNAL MEDICINE

## 2019-08-27 PROCEDURE — 4040F PNEUMOC VAC/ADMIN/RCVD: CPT | Performed by: INTERNAL MEDICINE

## 2019-08-27 PROCEDURE — G8417 CALC BMI ABV UP PARAM F/U: HCPCS | Performed by: INTERNAL MEDICINE

## 2019-08-27 PROCEDURE — 1090F PRES/ABSN URINE INCON ASSESS: CPT | Performed by: INTERNAL MEDICINE

## 2019-08-27 PROCEDURE — 1036F TOBACCO NON-USER: CPT | Performed by: INTERNAL MEDICINE

## 2019-08-27 PROCEDURE — 1123F ACP DISCUSS/DSCN MKR DOCD: CPT | Performed by: INTERNAL MEDICINE

## 2019-08-27 NOTE — PROGRESS NOTES
Intermittent palpitations     Need for pneumococcal vaccine     last screen  2004 and had stopped screening    Obesity (BMI 30-39. 9)     Other screening mammogram     Type II or unspecified type diabetes mellitus without mention of complication, not stated as uncontrolled     April 2011 Dr. Xavier Gonzalez- normal DM exam, podiatry Dr. Navneet Dacosta- seen yearly     Past Surgical History:   Procedure Laterality Date    CARDIAC CATHETERIZATION  12/10/14    Left ventricular end diastolic pressure was 1 mmHg, Left ventriculography revealed an,  2. EF around 50%, The left main is normal, The left anterior descending artery has 99%  mid segment stenosis and mildly aneursymal appearance.  The left circumflex is mild  disease, The right coronary artery is mild disease    CATARACT REMOVAL  4/2011    no diabetic retinopathy; due in 1 year    CORONARY ANGIOPLASTY WITH STENT PLACEMENT  12/10/14    Successful stenting of the mid LAD with reduction of stenosis from 99% to 0%    EYE SURGERY      left eye    KNEE ARTHROSCOPY      SHOULDER SURGERY Right     TONSILLECTOMY       Family History   Problem Relation Age of Onset    Cancer Sister     Heart Attack Brother      Social History     Tobacco Use    Smoking status: Never Smoker    Smokeless tobacco: Never Used   Substance Use Topics    Alcohol use: No     Alcohol/week: 0.0 standard drinks      @[employer]@  Review of Systems:   · Constitutional: No Fever or Weight Loss   · Eyes: No Decreased Vision  · ENT: No Headaches, Hearing Loss or Vertigo  · Cardiovascular: No chest pain, dyspnea on exertion, palpitations or loss of consciousness  · Respiratory: No cough or wheezing    · Gastrointestinal: No abdominal pain, appetite loss, blood in stools, constipation, diarrhea or heartburn  · Genitourinary: No dysuria, trouble voiding, or hematuria  · Musculoskeletal:  No gait disturbance, weakness or joint complaints  · Integumentary: No rash or pruritis  · Neurological: No TIA or stroke There were no gross focal neurologic abnormalities. All sensory and motor nerves examined and were normal  Psychiatric: normal mood and affect    No results found for: CKTOTAL, CKMB, CKMBINDEX, TROPONINI  BNP:  No results found for: BNP  PT/INR:  No results found for: PTINR  Lab Results   Component Value Date    LABA1C 7.4 (H) 01/10/2018    LABA1C 6.6 (H) 06/17/2015     Lab Results   Component Value Date    CHOL 155 01/10/2018    TRIG 89 01/10/2018    HDL 66 01/10/2018    LDLDIRECT 78 01/10/2018     Lab Results   Component Value Date    ALT 19 11/20/2012    AST 23 11/20/2012     TSH:  No results found for: TSH    Impression:  Cecilia Gonzalez is a 80 y. o.year old who  has a past medical history of Allergic rhinitis, CAD (coronary artery disease), Chronic nausea, GERD (gastroesophageal reflux disease), H/O cardiovascular stress test, H/O cardiovascular stress test, H/O cardiovascular stress test, H/O echocardiogram, H/O echocardiogram, H/O percutaneous left heart catheterization, H/O right coronary artery stent placement, History of echocardiogram, Hypertension, Intermittent palpitations, Need for pneumococcal vaccine, Obesity (BMI 30-39.9), Other screening mammogram, and Type II or unspecified type diabetes mellitus without mention of complication, not stated as uncontrolled. and presents with     Plan:  1. CAD: Continue aspirin and plavix , continue statins,betablockers,   2. Leg swelling: venous doppler  3. Shortness of breath: resolved  4. DM: stable. Stopped  Glipizide on acarbose  5. Dyslipidemia: continue statins. 6. HTN: stable, continue lopressor, norvasc and off hyzaar  7. Sweating: better since she stopped oxybutinin  8. ,All labs, medications and tests reviewed, continue all other medications of all above medical condition listed as is.

## 2019-08-29 ENCOUNTER — TELEPHONE (OUTPATIENT)
Dept: CARDIOLOGY CLINIC | Age: 84
End: 2019-08-29

## 2019-09-06 ENCOUNTER — PROCEDURE VISIT (OUTPATIENT)
Dept: CARDIOLOGY CLINIC | Age: 84
End: 2019-09-06
Payer: MEDICARE

## 2019-09-06 DIAGNOSIS — M79.89 LEG SWELLING: Primary | ICD-10-CM

## 2019-09-06 PROCEDURE — 93970 EXTREMITY STUDY: CPT | Performed by: INTERNAL MEDICINE

## 2019-09-10 ENCOUNTER — TELEPHONE (OUTPATIENT)
Dept: CARDIOLOGY CLINIC | Age: 84
End: 2019-09-10

## 2019-12-17 ENCOUNTER — APPOINTMENT (OUTPATIENT)
Dept: CT IMAGING | Age: 84
End: 2019-12-17
Payer: MEDICARE

## 2019-12-17 ENCOUNTER — HOSPITAL ENCOUNTER (EMERGENCY)
Age: 84
Discharge: HOME | End: 2019-12-17
Attending: EMERGENCY MEDICINE
Payer: MEDICARE

## 2019-12-17 ENCOUNTER — APPOINTMENT (OUTPATIENT)
Dept: GENERAL RADIOLOGY | Age: 84
End: 2019-12-17
Payer: MEDICARE

## 2019-12-17 VITALS
BODY MASS INDEX: 29.35 KG/M2 | HEART RATE: 74 BPM | TEMPERATURE: 98.2 F | DIASTOLIC BLOOD PRESSURE: 63 MMHG | HEIGHT: 67 IN | OXYGEN SATURATION: 99 % | RESPIRATION RATE: 18 BRPM | WEIGHT: 187 LBS | SYSTOLIC BLOOD PRESSURE: 140 MMHG

## 2019-12-17 DIAGNOSIS — R11.2 NON-INTRACTABLE VOMITING WITH NAUSEA, UNSPECIFIED VOMITING TYPE: Primary | ICD-10-CM

## 2019-12-17 DIAGNOSIS — N30.00 ACUTE CYSTITIS WITHOUT HEMATURIA: ICD-10-CM

## 2019-12-17 DIAGNOSIS — K44.9 HIATAL HERNIA: ICD-10-CM

## 2019-12-17 LAB
ALBUMIN SERPL-MCNC: 2.9 GM/DL (ref 3.4–5)
ALP BLD-CCNC: 68 IU/L (ref 40–129)
ALT SERPL-CCNC: 16 U/L (ref 10–40)
ANION GAP SERPL CALCULATED.3IONS-SCNC: 13 MMOL/L (ref 4–16)
AST SERPL-CCNC: 35 IU/L (ref 15–37)
BACTERIA: ABNORMAL /HPF
BASOPHILS ABSOLUTE: 0 K/CU MM
BASOPHILS RELATIVE PERCENT: 0.2 % (ref 0–1)
BILIRUB SERPL-MCNC: 0.6 MG/DL (ref 0–1)
BILIRUBIN URINE: NEGATIVE MG/DL
BLOOD, URINE: NEGATIVE
BUN BLDV-MCNC: 34 MG/DL (ref 6–23)
CALCIUM SERPL-MCNC: 9.1 MG/DL (ref 8.3–10.6)
CHLORIDE BLD-SCNC: 107 MMOL/L (ref 99–110)
CLARITY: ABNORMAL
CO2: 16 MMOL/L (ref 21–32)
COLOR: YELLOW
CREAT SERPL-MCNC: 1.2 MG/DL (ref 0.6–1.1)
DIFFERENTIAL TYPE: ABNORMAL
EOSINOPHILS ABSOLUTE: 0 K/CU MM
EOSINOPHILS RELATIVE PERCENT: 0.2 % (ref 0–3)
GFR AFRICAN AMERICAN: 51 ML/MIN/1.73M2
GFR NON-AFRICAN AMERICAN: 42 ML/MIN/1.73M2
GLUCOSE BLD-MCNC: 140 MG/DL (ref 70–99)
GLUCOSE BLD-MCNC: 150 MG/DL (ref 70–99)
GLUCOSE, URINE: NEGATIVE MG/DL
HCT VFR BLD CALC: 44.8 % (ref 37–47)
HEMOGLOBIN: 14.4 GM/DL (ref 12.5–16)
IMMATURE NEUTROPHIL %: 0.4 % (ref 0–0.43)
KETONES, URINE: NEGATIVE MG/DL
LACTATE: 1.8 MMOL/L (ref 0.4–2)
LEUKOCYTE ESTERASE, URINE: ABNORMAL
LIPASE: 12 IU/L (ref 13–60)
LYMPHOCYTES ABSOLUTE: 0.4 K/CU MM
LYMPHOCYTES RELATIVE PERCENT: 4 % (ref 24–44)
MCH RBC QN AUTO: 29.6 PG (ref 27–31)
MCHC RBC AUTO-ENTMCNC: 32.1 % (ref 32–36)
MCV RBC AUTO: 92.2 FL (ref 78–100)
MONOCYTES ABSOLUTE: 0.7 K/CU MM
MONOCYTES RELATIVE PERCENT: 7.1 % (ref 0–4)
MUCUS: ABNORMAL HPF
NITRITE URINE, QUANTITATIVE: NEGATIVE
NUCLEATED RBC %: 0 %
PDW BLD-RTO: 12.7 % (ref 11.7–14.9)
PH, URINE: 5 (ref 5–8)
PLATELET # BLD: 201 K/CU MM (ref 140–440)
PMV BLD AUTO: 11.6 FL (ref 7.5–11.1)
POTASSIUM SERPL-SCNC: 4.4 MMOL/L (ref 3.5–5.1)
PROTEIN UA: 30 MG/DL
RAPID INFLUENZA  B AGN: NEGATIVE
RAPID INFLUENZA A AGN: NEGATIVE
RBC # BLD: 4.86 M/CU MM (ref 4.2–5.4)
RBC URINE: 1 /HPF (ref 0–6)
REASON FOR REJECTION: NORMAL
REJECTED TEST: NORMAL
REJECTED TEST: NORMAL
SEGMENTED NEUTROPHILS ABSOLUTE COUNT: 8.4 K/CU MM
SEGMENTED NEUTROPHILS RELATIVE PERCENT: 88.1 % (ref 36–66)
SODIUM BLD-SCNC: 136 MMOL/L (ref 135–145)
SPECIFIC GRAVITY UA: 1.02 (ref 1–1.03)
SQUAMOUS EPITHELIAL: 2 /HPF
TOTAL CK: 111 IU/L (ref 26–140)
TOTAL IMMATURE NEUTOROPHIL: 0.04 K/CU MM
TOTAL NUCLEATED RBC: 0 K/CU MM
TOTAL PROTEIN: 6.8 GM/DL (ref 6.4–8.2)
TRANSITIONAL EPITHELIAL: <1 /HPF
TRICHOMONAS: ABNORMAL /HPF
TROPONIN T: <0.01 NG/ML
UROBILINOGEN, URINE: NORMAL MG/DL (ref 0.2–1)
WBC # BLD: 9.6 K/CU MM (ref 4–10.5)
WBC UA: 17 /HPF (ref 0–5)

## 2019-12-17 PROCEDURE — 85025 COMPLETE CBC W/AUTO DIFF WBC: CPT

## 2019-12-17 PROCEDURE — 74176 CT ABD & PELVIS W/O CONTRAST: CPT

## 2019-12-17 PROCEDURE — 96374 THER/PROPH/DIAG INJ IV PUSH: CPT

## 2019-12-17 PROCEDURE — 83690 ASSAY OF LIPASE: CPT

## 2019-12-17 PROCEDURE — 80053 COMPREHEN METABOLIC PANEL: CPT

## 2019-12-17 PROCEDURE — 87804 INFLUENZA ASSAY W/OPTIC: CPT

## 2019-12-17 PROCEDURE — 99284 EMERGENCY DEPT VISIT MOD MDM: CPT

## 2019-12-17 PROCEDURE — 81001 URINALYSIS AUTO W/SCOPE: CPT

## 2019-12-17 PROCEDURE — 6370000000 HC RX 637 (ALT 250 FOR IP): Performed by: EMERGENCY MEDICINE

## 2019-12-17 PROCEDURE — 83605 ASSAY OF LACTIC ACID: CPT

## 2019-12-17 PROCEDURE — 82550 ASSAY OF CK (CPK): CPT

## 2019-12-17 PROCEDURE — 71045 X-RAY EXAM CHEST 1 VIEW: CPT

## 2019-12-17 PROCEDURE — 84484 ASSAY OF TROPONIN QUANT: CPT

## 2019-12-17 PROCEDURE — 87086 URINE CULTURE/COLONY COUNT: CPT

## 2019-12-17 PROCEDURE — 93005 ELECTROCARDIOGRAM TRACING: CPT | Performed by: EMERGENCY MEDICINE

## 2019-12-17 PROCEDURE — 82962 GLUCOSE BLOOD TEST: CPT

## 2019-12-17 PROCEDURE — 6360000002 HC RX W HCPCS: Performed by: EMERGENCY MEDICINE

## 2019-12-17 RX ORDER — ONDANSETRON 4 MG/1
4 TABLET, ORALLY DISINTEGRATING ORAL 3 TIMES DAILY PRN
Qty: 21 TABLET | Refills: 0 | Status: SHIPPED | OUTPATIENT
Start: 2019-12-17 | End: 2020-11-30

## 2019-12-17 RX ORDER — CEPHALEXIN 250 MG/1
500 CAPSULE ORAL ONCE
Status: COMPLETED | OUTPATIENT
Start: 2019-12-17 | End: 2019-12-17

## 2019-12-17 RX ORDER — ONDANSETRON 2 MG/ML
4 INJECTION INTRAMUSCULAR; INTRAVENOUS EVERY 30 MIN PRN
Status: DISCONTINUED | OUTPATIENT
Start: 2019-12-17 | End: 2019-12-17 | Stop reason: HOSPADM

## 2019-12-17 RX ORDER — CEPHALEXIN 500 MG/1
500 CAPSULE ORAL 2 TIMES DAILY
Qty: 14 CAPSULE | Refills: 0 | Status: SHIPPED | OUTPATIENT
Start: 2019-12-17 | End: 2020-11-30

## 2019-12-17 RX ADMIN — CEPHALEXIN 500 MG: 250 CAPSULE ORAL at 12:50

## 2019-12-17 RX ADMIN — ONDANSETRON 4 MG: 2 INJECTION INTRAMUSCULAR; INTRAVENOUS at 08:49

## 2019-12-17 ASSESSMENT — PAIN DESCRIPTION - DESCRIPTORS: DESCRIPTORS: NAGGING;DISCOMFORT

## 2019-12-17 ASSESSMENT — PAIN DESCRIPTION - LOCATION: LOCATION: ABDOMEN

## 2019-12-17 ASSESSMENT — PAIN SCALES - GENERAL: PAINLEVEL_OUTOF10: 7

## 2019-12-18 LAB
CULTURE: ABNORMAL
CULTURE: ABNORMAL
Lab: ABNORMAL
SPECIMEN: ABNORMAL
TOTAL COLONY COUNT: ABNORMAL

## 2019-12-18 PROCEDURE — 93010 ELECTROCARDIOGRAM REPORT: CPT | Performed by: INTERNAL MEDICINE

## 2020-01-13 LAB
EKG ATRIAL RATE: 78 BPM
EKG DIAGNOSIS: NORMAL
EKG P AXIS: -17 DEGREES
EKG P-R INTERVAL: 172 MS
EKG Q-T INTERVAL: 332 MS
EKG QRS DURATION: 86 MS
EKG QTC CALCULATION (BAZETT): 378 MS
EKG R AXIS: -46 DEGREES
EKG T AXIS: -47 DEGREES
EKG VENTRICULAR RATE: 78 BPM

## 2020-02-28 ENCOUNTER — OFFICE VISIT (OUTPATIENT)
Dept: CARDIOLOGY CLINIC | Age: 85
End: 2020-02-28
Payer: MEDICARE

## 2020-02-28 VITALS
SYSTOLIC BLOOD PRESSURE: 136 MMHG | HEIGHT: 66 IN | BODY MASS INDEX: 29.57 KG/M2 | HEART RATE: 60 BPM | WEIGHT: 184 LBS | DIASTOLIC BLOOD PRESSURE: 80 MMHG

## 2020-02-28 PROCEDURE — 1123F ACP DISCUSS/DSCN MKR DOCD: CPT | Performed by: INTERNAL MEDICINE

## 2020-02-28 PROCEDURE — 99214 OFFICE O/P EST MOD 30 MIN: CPT | Performed by: INTERNAL MEDICINE

## 2020-02-28 PROCEDURE — 1036F TOBACCO NON-USER: CPT | Performed by: INTERNAL MEDICINE

## 2020-02-28 PROCEDURE — G8484 FLU IMMUNIZE NO ADMIN: HCPCS | Performed by: INTERNAL MEDICINE

## 2020-02-28 PROCEDURE — 1090F PRES/ABSN URINE INCON ASSESS: CPT | Performed by: INTERNAL MEDICINE

## 2020-02-28 PROCEDURE — G8427 DOCREV CUR MEDS BY ELIG CLIN: HCPCS | Performed by: INTERNAL MEDICINE

## 2020-02-28 PROCEDURE — G8417 CALC BMI ABV UP PARAM F/U: HCPCS | Performed by: INTERNAL MEDICINE

## 2020-02-28 PROCEDURE — 4040F PNEUMOC VAC/ADMIN/RCVD: CPT | Performed by: INTERNAL MEDICINE

## 2020-02-28 NOTE — PROGRESS NOTES
tablet Take 1 tablet by mouth daily (Patient taking differently: Take 5 mg by mouth daily ) 90 tablet 3    pantoprazole (PROTONIX) 40 MG tablet Take 40 mg by mouth daily.  traMADol (ULTRAM) 50 MG tablet Take 50 mg by mouth every 8 hours as needed for Pain. Ravi Combjoseph Cholecalciferol (VITAMIN D3) 5000 UNITS TABS Take 1 each by mouth daily       magnesium oxide (MAG-OX) 400 MG tablet Take 400 mg by mouth daily.  Glucose Blood (BLOOD GLUCOSE TEST STRIPS) STRP Test strips for Contour glucometer. Tests once daily. 50 strip 6    naproxen (NAPROSYN) 500 MG tablet Take 1 tablet by mouth 2 times daily as needed for Pain 20 tablet 0    simvastatin (ZOCOR) 20 MG tablet Take 1 tablet by mouth nightly 90 tablet 3     No current facility-administered medications for this visit. Allergies: Codeine; Demerol; Hydrocodone-acetaminophen; Iodine; Metformin; Metformin and related [metformin and related]; Vicodin [hydrocodone-acetaminophen]; Bactrim; and Dexlansoprazole  Past Medical History:   Diagnosis Date    Allergic rhinitis     CAD (coronary artery disease)     Chronic nausea     seen GI in the past with EGD that was normal     GERD (gastroesophageal reflux disease)     H/O cardiovascular stress test 11/19/2014    moderate to sverere ischemia apical inferior, transient ischemic dilation ,EF70%    H/O cardiovascular stress test 1/28/2015    cardiolite-normal,EF70%    H/O cardiovascular stress test 4/16/2015    lexiscan-normal,EF70%    H/O Doppler lower venous ultrasound 09/06/2019     No significant reflux noted in the veins of the bilateral lower extremities. No DVT or SVT.    H/O echocardiogram 11/19/14    EF 55%.  H/O echocardiogram 11/29/2018    EF: 55-60% Mild AR, TR, Mild pulm HTN at 37 mmHg     H/O percutaneous left heart catheterization 12/10/14    Left ventricular end diastolic pressure was 1 mmHg, Left ventriculography revealed an,  2. EF around 50%, The left main is normal, The left anterior descending artery has 99%  mid segment stenosis and mildly aneursymal appearance. The left circumflex is mild  disease, The right coronary artery is mild disease    H/O right coronary artery stent placement 12/10/14    Successful stenting of the mid LAD with reduction of stenosis from 99% to 0%    History of echocardiogram 11/03/2016     EF 50-60%·. This 2D echocardiogram is normal, except for mild LVH     Hypertension     Intermittent palpitations     Need for pneumococcal vaccine     last screen  2004 and had stopped screening    Obesity (BMI 30-39. 9)     Other screening mammogram     Type II or unspecified type diabetes mellitus without mention of complication, not stated as uncontrolled     April 2011 Dr. Amelia Renteria- normal DM exam, podiatry Dr. Vandana Miller- seen yearly     Past Surgical History:   Procedure Laterality Date    CARDIAC CATHETERIZATION  12/10/14    Left ventricular end diastolic pressure was 1 mmHg, Left ventriculography revealed an,  2. EF around 50%, The left main is normal, The left anterior descending artery has 99%  mid segment stenosis and mildly aneursymal appearance.  The left circumflex is mild  disease, The right coronary artery is mild disease    CATARACT REMOVAL  4/2011    no diabetic retinopathy; due in 1 year    CORONARY ANGIOPLASTY WITH STENT PLACEMENT  12/10/14    Successful stenting of the mid LAD with reduction of stenosis from 99% to 0%    EYE SURGERY      left eye    KNEE ARTHROSCOPY      SHOULDER SURGERY Right     TONSILLECTOMY       Family History   Problem Relation Age of Onset    Cancer Sister     Heart Attack Brother      Social History     Tobacco Use    Smoking status: Never Smoker    Smokeless tobacco: Never Used   Substance Use Topics    Alcohol use: No     Alcohol/week: 0.0 standard drinks      [unfilled]  Review of Systems:   · Constitutional: No Fever or Weight Loss   · Eyes: No Decreased Vision  · ENT: No Headaches, Hearing Loss or Vertigo  · Cardiovascular: No chest pain, dyspnea on exertion, palpitations or loss of consciousness  · Respiratory: No cough or wheezing    · Gastrointestinal: No abdominal pain, appetite loss, blood in stools, constipation, diarrhea or heartburn  · Genitourinary: No dysuria, trouble voiding, or hematuria  · Musculoskeletal:  No gait disturbance, weakness or joint complaints  · Integumentary: No rash or pruritis  · Neurological: No TIA or stroke symptoms  · Psychiatric: No anxiety or depression  · Endocrine: No malaise, fatigue or temperature intolerance  · Hematologic/Lymphatic: No bleeding problems, blood clots or swollen lymph nodes  · Allergic/Immunologic: No nasal congestion or hives  All systems negative except as marked. Objective:  /80   Pulse 60   Ht 5' 6\" (1.676 m)   Wt 184 lb (83.5 kg)   BMI 29.70 kg/m²   Wt Readings from Last 3 Encounters:   02/28/20 184 lb (83.5 kg)   12/17/19 187 lb (84.8 kg)   08/27/19 191 lb (86.6 kg)     Body mass index is 29.7 kg/m². GENERAL - Alert, oriented, pleasant, in no apparent distress,normal grooming  HEENT - pupils are reactive to light and accomodation, cornea intact, conjunctive normal color, ears are normal in exam,throat exam in normal, teeth, gum and palate are normal, oral mucosa is normal without any notation of pallor or cyanosis  Neck - Supple. No jugular venous distention noted. No carotid bruits, no apical -carotid delay  Respiratory:  Normal breath sounds, No respiratory distress, No wheezing, No chest tenderness. ,no use of accessory muscles, diaphragm movement is normal  Cardiovascular: (PMI) apex non displaced,no lifts no thrills, no s3,no s4, Normal heart rate, Normal rhythm, No murmurs, No rubs, No gallops.  Carotid arteries pulse and amplitude are normal no bruit, no abdominal bruit noted ( normal abdominal aorta ausculation), femoral arteries pulse and amplitude are normal no bruit, pedal pulses are normal  Femoral pulses have normal amplitude, no bruits   Extremities - No cyanosis, clubbing, or significant edema, no varicose veins    Abdomen - No masses, tenderness, or organomegaly, no hepato-splenomegally, no bruits  Musculoskeletal - No significant edema, no kyphosis or scoliosis, no deformity in any extremity noted, muscle strength and tone are normal  Skin: no ulcer,no scar,no stasis dermatitis   Neurologic - alert oriented times 3,Cranial nerves II through XII are grossly intact. There were no gross focal neurologic abnormalities. All sensory and motor nerves examined and were normal  Psychiatric: normal mood and affect    Lab Results   Component Value Date    CKTOTAL 111 12/17/2019     BNP:  No results found for: BNP  PT/INR:  No results found for: PTINR  Lab Results   Component Value Date    LABA1C 7.4 (H) 01/10/2018    LABA1C 6.6 (H) 06/17/2015     Lab Results   Component Value Date    CHOL 155 01/10/2018    TRIG 89 01/10/2018    HDL 66 01/10/2018    LDLDIRECT 78 01/10/2018     Lab Results   Component Value Date    ALT 16 12/17/2019    AST 35 12/17/2019     TSH:  No results found for: TSH    Impression:  Jo Shah is a 80 y. o.year old who  has a past medical history of Allergic rhinitis, CAD (coronary artery disease), Chronic nausea, GERD (gastroesophageal reflux disease), H/O cardiovascular stress test, H/O cardiovascular stress test, H/O cardiovascular stress test, H/O Doppler lower venous ultrasound, H/O echocardiogram, H/O echocardiogram, H/O percutaneous left heart catheterization, H/O right coronary artery stent placement, History of echocardiogram, Hypertension, Intermittent palpitations, Need for pneumococcal vaccine, Obesity (BMI 30-39.9), Other screening mammogram, and Type II or unspecified type diabetes mellitus without mention of complication, not stated as uncontrolled. and presents with     Plan:  1. CAD:h/o LAD PCI 2014,Continue aspirin , continue statins,betablockers, ok to stop plavix  2.  Leg swelling: venous doppler is wnl, leg swelling is better. 3. Shortness of breath: resolved  4. DM: stable. Stopped  Glipizide on acarbose  5. Dyslipidemia: continue statins. 6. HTN: stable, continue lopressor, norvasc and off hyzaar  7. Sweating: better since she stopped oxybutinin  8. Health maintenance: exerise and diet  All labs, medications and tests reviewed, continue all other medications of all above medical condition listed as is.

## 2020-02-28 NOTE — LETTER
CLINICAL STAFF DOCUMENTATION    Venkat Alisson  6/26/1928  [id number]    Have you had any Chest Pain - No      Have you had any Shortness of Breath - No      Have you had any dizziness - No      Have you had any palpitations - No      Do you have any edema - swelling in legs          Do you have a surgery or procedure scheduled in the near future - No

## 2020-03-03 ENCOUNTER — HOSPITAL ENCOUNTER (OUTPATIENT)
Age: 85
Discharge: HOME | End: 2020-03-03
Payer: MEDICARE

## 2020-03-03 LAB
ALBUMIN SERPL-MCNC: 3.8 GM/DL (ref 3.4–5)
ANION GAP SERPL CALCULATED.3IONS-SCNC: 12 MMOL/L (ref 4–16)
BUN BLDV-MCNC: 23 MG/DL (ref 6–23)
CALCIUM SERPL-MCNC: 9.7 MG/DL (ref 8.3–10.6)
CHLORIDE BLD-SCNC: 104 MMOL/L (ref 99–110)
CO2: 25 MMOL/L (ref 21–32)
CREAT SERPL-MCNC: 1.3 MG/DL (ref 0.6–1.1)
GFR AFRICAN AMERICAN: 46 ML/MIN/1.73M2
GFR NON-AFRICAN AMERICAN: 38 ML/MIN/1.73M2
GLUCOSE BLD-MCNC: 114 MG/DL (ref 70–99)
PHOSPHORUS: 3.6 MG/DL (ref 2.5–4.9)
POTASSIUM SERPL-SCNC: 4.5 MMOL/L (ref 3.5–5.1)
SODIUM BLD-SCNC: 141 MMOL/L (ref 135–145)

## 2020-03-03 PROCEDURE — 80069 RENAL FUNCTION PANEL: CPT

## 2020-03-03 PROCEDURE — 36415 COLL VENOUS BLD VENIPUNCTURE: CPT

## 2020-03-24 ENCOUNTER — TELEPHONE (OUTPATIENT)
Dept: CARDIOLOGY CLINIC | Age: 85
End: 2020-03-24

## 2020-05-28 ENCOUNTER — OFFICE VISIT (OUTPATIENT)
Dept: CARDIOLOGY CLINIC | Age: 85
End: 2020-05-28
Payer: MEDICARE

## 2020-05-28 VITALS
HEIGHT: 66 IN | WEIGHT: 191 LBS | HEART RATE: 64 BPM | SYSTOLIC BLOOD PRESSURE: 134 MMHG | DIASTOLIC BLOOD PRESSURE: 80 MMHG | BODY MASS INDEX: 30.7 KG/M2

## 2020-05-28 PROCEDURE — 4040F PNEUMOC VAC/ADMIN/RCVD: CPT | Performed by: INTERNAL MEDICINE

## 2020-05-28 PROCEDURE — G8417 CALC BMI ABV UP PARAM F/U: HCPCS | Performed by: INTERNAL MEDICINE

## 2020-05-28 PROCEDURE — 99214 OFFICE O/P EST MOD 30 MIN: CPT | Performed by: INTERNAL MEDICINE

## 2020-05-28 PROCEDURE — 1123F ACP DISCUSS/DSCN MKR DOCD: CPT | Performed by: INTERNAL MEDICINE

## 2020-05-28 PROCEDURE — G8428 CUR MEDS NOT DOCUMENT: HCPCS | Performed by: INTERNAL MEDICINE

## 2020-05-28 PROCEDURE — 1090F PRES/ABSN URINE INCON ASSESS: CPT | Performed by: INTERNAL MEDICINE

## 2020-05-28 PROCEDURE — 1036F TOBACCO NON-USER: CPT | Performed by: INTERNAL MEDICINE

## 2020-05-28 NOTE — PROGRESS NOTES
anterior descending artery has 99%  mid segment stenosis and mildly aneursymal appearance. The left circumflex is mild  disease, The right coronary artery is mild disease    H/O right coronary artery stent placement 12/10/14    Successful stenting of the mid LAD with reduction of stenosis from 99% to 0%    History of echocardiogram 11/03/2016     EF 50-60%·. This 2D echocardiogram is normal, except for mild LVH     Hypertension     Intermittent palpitations     Need for pneumococcal vaccine     last screen  2004 and had stopped screening    Obesity (BMI 30-39. 9)     Other screening mammogram     Type II or unspecified type diabetes mellitus without mention of complication, not stated as uncontrolled     April 2011 Dr. Barbara Casas- normal DM exam, podiatry Dr. Domonique Wu- seen yearly     Past Surgical History:   Procedure Laterality Date    CARDIAC CATHETERIZATION  12/10/14    Left ventricular end diastolic pressure was 1 mmHg, Left ventriculography revealed an,  2. EF around 50%, The left main is normal, The left anterior descending artery has 99%  mid segment stenosis and mildly aneursymal appearance.  The left circumflex is mild  disease, The right coronary artery is mild disease    CATARACT REMOVAL  4/2011    no diabetic retinopathy; due in 1 year    CORONARY ANGIOPLASTY WITH STENT PLACEMENT  12/10/14    Successful stenting of the mid LAD with reduction of stenosis from 99% to 0%    EYE SURGERY      left eye    KNEE ARTHROSCOPY      SHOULDER SURGERY Right     TONSILLECTOMY       Family History   Problem Relation Age of Onset    Cancer Sister     Heart Attack Brother      Social History     Tobacco Use    Smoking status: Never Smoker    Smokeless tobacco: Never Used   Substance Use Topics    Alcohol use: No     Alcohol/week: 0.0 standard drinks     Comment: caffeine 1 cup of coffee a day      [unfilled]  Review of Systems:   · Constitutional: No Fever or Weight Loss   · Eyes: No Decreased Vision  · ENT: No

## 2020-09-05 ENCOUNTER — HOSPITAL ENCOUNTER (EMERGENCY)
Age: 85
Discharge: HOME | End: 2020-09-05
Attending: EMERGENCY MEDICINE
Payer: MEDICARE

## 2020-09-05 VITALS
RESPIRATION RATE: 16 BRPM | WEIGHT: 190 LBS | DIASTOLIC BLOOD PRESSURE: 68 MMHG | OXYGEN SATURATION: 96 % | HEART RATE: 71 BPM | BODY MASS INDEX: 30.53 KG/M2 | TEMPERATURE: 97.9 F | HEIGHT: 66 IN | SYSTOLIC BLOOD PRESSURE: 166 MMHG

## 2020-09-05 LAB
ALBUMIN SERPL-MCNC: 3.8 GM/DL (ref 3.4–5)
ALP BLD-CCNC: 88 IU/L (ref 40–129)
ALT SERPL-CCNC: 16 U/L (ref 10–40)
ANION GAP SERPL CALCULATED.3IONS-SCNC: 12 MMOL/L (ref 4–16)
AST SERPL-CCNC: 21 IU/L (ref 15–37)
BACTERIA: ABNORMAL /HPF
BASOPHILS ABSOLUTE: 0.1 K/CU MM
BASOPHILS RELATIVE PERCENT: 0.7 % (ref 0–1)
BILIRUB SERPL-MCNC: 0.2 MG/DL (ref 0–1)
BILIRUBIN URINE: NEGATIVE MG/DL
BLOOD, URINE: NEGATIVE
BUN BLDV-MCNC: 22 MG/DL (ref 6–23)
CALCIUM SERPL-MCNC: 9.2 MG/DL (ref 8.3–10.6)
CHLORIDE BLD-SCNC: 104 MMOL/L (ref 99–110)
CHP ED QC CHECK: NORMAL
CLARITY: ABNORMAL
CO2: 23 MMOL/L (ref 21–32)
COLOR: YELLOW
CREAT SERPL-MCNC: 1.1 MG/DL (ref 0.6–1.1)
DIFFERENTIAL TYPE: ABNORMAL
EOSINOPHILS ABSOLUTE: 0.4 K/CU MM
EOSINOPHILS RELATIVE PERCENT: 4.1 % (ref 0–3)
GFR AFRICAN AMERICAN: 56 ML/MIN/1.73M2
GFR NON-AFRICAN AMERICAN: 46 ML/MIN/1.73M2
GLUCOSE BLD-MCNC: 118 MG/DL (ref 70–99)
GLUCOSE BLD-MCNC: 120 MG/DL
GLUCOSE BLD-MCNC: 120 MG/DL (ref 70–99)
GLUCOSE, URINE: NEGATIVE MG/DL
HCT VFR BLD CALC: 42 % (ref 37–47)
HEMOGLOBIN: 13.7 GM/DL (ref 12.5–16)
IMMATURE NEUTROPHIL %: 0.2 % (ref 0–0.43)
KETONES, URINE: NEGATIVE MG/DL
LACTATE: 1.7 MMOL/L (ref 0.4–2)
LEUKOCYTE ESTERASE, URINE: ABNORMAL
LIPASE: 30 IU/L (ref 13–60)
LYMPHOCYTES ABSOLUTE: 1.5 K/CU MM
LYMPHOCYTES RELATIVE PERCENT: 17.6 % (ref 24–44)
MCH RBC QN AUTO: 29.9 PG (ref 27–31)
MCHC RBC AUTO-ENTMCNC: 32.6 % (ref 32–36)
MCV RBC AUTO: 91.7 FL (ref 78–100)
MONOCYTES ABSOLUTE: 0.8 K/CU MM
MONOCYTES RELATIVE PERCENT: 9.5 % (ref 0–4)
MUCUS: ABNORMAL HPF
NITRITE URINE, QUANTITATIVE: NEGATIVE
NUCLEATED RBC %: 0 %
PDW BLD-RTO: 12.5 % (ref 11.7–14.9)
PH, URINE: 6 (ref 5–8)
PLATELET # BLD: 215 K/CU MM (ref 140–440)
PMV BLD AUTO: 11 FL (ref 7.5–11.1)
POTASSIUM SERPL-SCNC: 3.9 MMOL/L (ref 3.5–5.1)
PROTEIN UA: 30 MG/DL
RBC # BLD: 4.58 M/CU MM (ref 4.2–5.4)
RBC URINE: 1 /HPF (ref 0–6)
SEGMENTED NEUTROPHILS ABSOLUTE COUNT: 5.9 K/CU MM
SEGMENTED NEUTROPHILS RELATIVE PERCENT: 67.9 % (ref 36–66)
SODIUM BLD-SCNC: 139 MMOL/L (ref 135–145)
SPECIFIC GRAVITY UA: 1.01 (ref 1–1.03)
SQUAMOUS EPITHELIAL: <1 /HPF
TOTAL IMMATURE NEUTOROPHIL: 0.02 K/CU MM
TOTAL NUCLEATED RBC: 0 K/CU MM
TOTAL PROTEIN: 6.7 GM/DL (ref 6.4–8.2)
TRANSITIONAL EPITHELIAL: <1 /HPF
TRICHOMONAS: ABNORMAL /HPF
TROPONIN T: <0.01 NG/ML
UROBILINOGEN, URINE: NORMAL MG/DL (ref 0.2–1)
WBC # BLD: 8.7 K/CU MM (ref 4–10.5)
WBC UA: 8 /HPF (ref 0–5)

## 2020-09-05 PROCEDURE — 93005 ELECTROCARDIOGRAM TRACING: CPT | Performed by: EMERGENCY MEDICINE

## 2020-09-05 PROCEDURE — 87086 URINE CULTURE/COLONY COUNT: CPT

## 2020-09-05 PROCEDURE — 83690 ASSAY OF LIPASE: CPT

## 2020-09-05 PROCEDURE — 80053 COMPREHEN METABOLIC PANEL: CPT

## 2020-09-05 PROCEDURE — 81001 URINALYSIS AUTO W/SCOPE: CPT

## 2020-09-05 PROCEDURE — 93010 ELECTROCARDIOGRAM REPORT: CPT | Performed by: INTERNAL MEDICINE

## 2020-09-05 PROCEDURE — 84484 ASSAY OF TROPONIN QUANT: CPT

## 2020-09-05 PROCEDURE — 83605 ASSAY OF LACTIC ACID: CPT

## 2020-09-05 PROCEDURE — 82962 GLUCOSE BLOOD TEST: CPT

## 2020-09-05 PROCEDURE — 85025 COMPLETE CBC W/AUTO DIFF WBC: CPT

## 2020-09-05 PROCEDURE — 6370000000 HC RX 637 (ALT 250 FOR IP): Performed by: EMERGENCY MEDICINE

## 2020-09-05 PROCEDURE — 99283 EMERGENCY DEPT VISIT LOW MDM: CPT

## 2020-09-05 RX ORDER — CEPHALEXIN 250 MG/1
500 CAPSULE ORAL ONCE
Status: COMPLETED | OUTPATIENT
Start: 2020-09-05 | End: 2020-09-05

## 2020-09-05 RX ORDER — ONDANSETRON 4 MG/1
4 TABLET, ORALLY DISINTEGRATING ORAL EVERY 8 HOURS PRN
Qty: 15 TABLET | Refills: 0 | Status: SHIPPED | OUTPATIENT
Start: 2020-09-05

## 2020-09-05 RX ORDER — CEPHALEXIN 500 MG/1
500 CAPSULE ORAL 2 TIMES DAILY
Qty: 14 CAPSULE | Refills: 0 | Status: SHIPPED | OUTPATIENT
Start: 2020-09-05 | End: 2020-09-12

## 2020-09-05 RX ADMIN — CEPHALEXIN 500 MG: 250 CAPSULE ORAL at 10:37

## 2020-09-05 ASSESSMENT — ENCOUNTER SYMPTOMS
COUGH: 0
SHORTNESS OF BREATH: 0
DIARRHEA: 0
RHINORRHEA: 0
ABDOMINAL PAIN: 0
EYE REDNESS: 0
SORE THROAT: 0
VOMITING: 0
CONSTIPATION: 0
NAUSEA: 1
BACK PAIN: 0

## 2020-09-05 NOTE — ED NOTES
Patient resting quietly in bed in a position of comfort, denies needs at this time.      Hyacinth Krikland RN  09/05/20 [id number]

## 2020-09-05 NOTE — ED NOTES
Pt advised of the need for urine sample. Voiced understanding, states \"I don't have to go right now. \"      Matt Otto RN  09/05/20 0749

## 2020-09-05 NOTE — ED PROVIDER NOTES
Triage Chief Complaint:   Other (reports low blood sugar) and Nausea    [place]:  Rosa Valerio is a 80 y.o. female with hx of diabetes that presents with feeling of weakness in her stomach and nausea. Denies arm or leg weakness. Describes symptoms as \"feeling mars funny. \" Started last night and took glucose tablets. No vomiting or abdominal pain. No fevers. No chest pain or shortness of breath. No cough. Denies any dysuria or increased urinary frequency. No diarrhea. States checked glucose level last night after she took the tablets last night and it was 117. She was able to eat dinner last night. No sick contacts. Lives alone and administers her own medications. No recent changes to her medications. ROS:   Review of Systems   Constitutional: Negative for chills and fever. HENT: Negative for congestion, rhinorrhea and sore throat. Eyes: Negative for redness and visual disturbance. Respiratory: Negative for cough and shortness of breath. Cardiovascular: Negative for chest pain and leg swelling. Gastrointestinal: Positive for nausea. Negative for abdominal pain, constipation, diarrhea and vomiting. Genitourinary: Negative for dysuria and frequency. Musculoskeletal: Negative for arthralgias and back pain. Skin: Negative for rash and wound. Neurological: Positive for weakness (as in HPI, no true extremity weakness). Negative for syncope and headaches. Psychiatric/Behavioral: Negative. Negative for hallucinations and suicidal ideas.        Past Medical History:   Diagnosis Date    Allergic rhinitis     CAD (coronary artery disease)     Chronic nausea     seen GI in the past with EGD that was normal     GERD (gastroesophageal reflux disease)     H/O cardiovascular stress test 11/19/2014    moderate to sverere ischemia apical inferior, transient ischemic dilation ,EF70%    H/O cardiovascular stress test 1/28/2015    cardiolite-normal,EF70%    H/O cardiovascular stress test 4/16/2015    lexiscan-normal,EF70%    H/O Doppler lower venous ultrasound 09/06/2019     No significant reflux noted in the veins of the bilateral lower extremities. No DVT or SVT.    H/O echocardiogram 11/19/14    EF 55%.  H/O echocardiogram 11/29/2018    EF: 55-60% Mild AR, TR, Mild pulm HTN at 37 mmHg     H/O percutaneous left heart catheterization 12/10/14    Left ventricular end diastolic pressure was 1 mmHg, Left ventriculography revealed an,  2. EF around 50%, The left main is normal, The left anterior descending artery has 99%  mid segment stenosis and mildly aneursymal appearance. The left circumflex is mild  disease, The right coronary artery is mild disease    H/O right coronary artery stent placement 12/10/14    Successful stenting of the mid LAD with reduction of stenosis from 99% to 0%    History of echocardiogram 11/03/2016     EF 50-60%·. This 2D echocardiogram is normal, except for mild LVH     Hypertension     Intermittent palpitations     Need for pneumococcal vaccine     last screen  2004 and had stopped screening    Obesity (BMI 30-39. 9)     Other screening mammogram     Type II or unspecified type diabetes mellitus without mention of complication, not stated as uncontrolled     April 2011 Dr. Julio Jose- normal DM exam, podiatry Dr. Anna Marie Baum- seen yearly     Past Surgical History:   Procedure Laterality Date    CARDIAC CATHETERIZATION  12/10/14    Left ventricular end diastolic pressure was 1 mmHg, Left ventriculography revealed an,  2. EF around 50%, The left main is normal, The left anterior descending artery has 99%  mid segment stenosis and mildly aneursymal appearance.  The left circumflex is mild  disease, The right coronary artery is mild disease    CATARACT REMOVAL  4/2011    no diabetic retinopathy; due in 1 year    CORONARY ANGIOPLASTY WITH STENT PLACEMENT  12/10/14    Successful stenting of the mid LAD with reduction of stenosis from 99% to 0%    EYE SURGERY      left eye    KNEE ARTHROSCOPY      SHOULDER SURGERY Right     TONSILLECTOMY       Family History   Problem Relation Age of Onset    Cancer Sister     Heart Attack Brother      Social History     Socioeconomic History    Marital status:      Spouse name: Not on file    Number of children: Not on file    Years of education: Not on file    Highest education level: Not on file   Occupational History    Occupation:       Comment: [employer] x 26 years   Social Needs    Financial resource strain: Not on file    Food insecurity     Worry: Not on file     Inability: Not on file   [employer] needs     Medical: Not on file     Non-medical: Not on file   Tobacco Use    Smoking status: Never Smoker    Smokeless tobacco: Never Used   Substance and Sexual Activity    Alcohol use: No     Alcohol/week: 0.0 standard drinks     Comment: caffeine 1 cup of coffee a day    Drug use: No    Sexual activity: Not Currently   Lifestyle    Physical activity     Days per week: Not on file     Minutes per session: Not on file    Stress: Not on file   Relationships    Social connections     Talks on phone: Not on file     Gets together: Not on file     Attends [employer] service: Not on file     Active member of club or organization: Not on file     Attends meetings of clubs or organizations: Not on file     Relationship status: Not on file    Intimate partner violence     Fear of current or ex partner: Not on file     Emotionally abused: Not on file     Physically abused: Not on file     Forced sexual activity: Not on file   Other Topics Concern    Not on file   Social History Narrative    Lives along with her Dog     No current facility-administered medications for this encounter.       Current Outpatient Medications   Medication Sig Dispense Refill    ondansetron (ZOFRAN ODT) 4 MG disintegrating tablet Take 1 tablet by mouth every 8 hours as needed for Nausea 15 tablet 0    cephALEXin (KEFLEX) 500 MG capsule Take 1 capsule by mouth 2 times daily for 7 days 14 capsule 0    ondansetron (ZOFRAN-ODT) 4 MG disintegrating tablet Take 1 tablet by mouth 3 times daily as needed for Nausea or Vomiting 21 tablet 0    cephALEXin (KEFLEX) 500 MG capsule Take 1 capsule by mouth 2 times daily 14 capsule 0    Compression Stockings MISC by Does not apply route 20 - 30 mmh Wear Daily Can Take Off At Night  Thigh High 1 each 0    aspirin (ASPIRIN LOW DOSE) 81 MG EC tablet Take 1 tablet by mouth daily 30 tablet 5    metoprolol succinate (TOPROL XL) 25 MG extended release tablet Take 25 mg by mouth daily      oxybutynin (DITROPAN) 5 MG tablet Take 5 mg by mouth 3 times daily      Omega-3 Fatty Acids (OMEGA 3 500 PO) Take by mouth      Probiotic Product (PROBIOTIC DAILY PO) Take by mouth      naproxen (NAPROSYN) 500 MG tablet Take 1 tablet by mouth 2 times daily as needed for Pain 20 tablet 0    acarbose (PRECOSE) 25 MG tablet Take 25 mg by mouth 2 times daily      simvastatin (ZOCOR) 20 MG tablet Take 1 tablet by mouth nightly 90 tablet 3    amLODIPine (NORVASC) 2.5 MG tablet Take 1 tablet by mouth daily (Patient taking differently: Take 5 mg by mouth daily ) 90 tablet 3    pantoprazole (PROTONIX) 40 MG tablet Take 40 mg by mouth daily.  traMADol (ULTRAM) 50 MG tablet Take 50 mg by mouth every 8 hours as needed for Pain. Cherylbea Galarza Cholecalciferol (VITAMIN D3) 5000 UNITS TABS Take 1 each by mouth daily       magnesium oxide (MAG-OX) 400 MG tablet Take 400 mg by mouth daily.  Glucose Blood (BLOOD GLUCOSE TEST STRIPS) STRP Test strips for Contour glucometer. Tests once daily.  50 strip 6     Allergies   Allergen Reactions    Codeine     Demerol     Hydrocodone-Acetaminophen     Iodine     Metformin Nausea Only     nausea    Metformin And Related [Metformin And Related] Nausea Only    Vicodin [Hydrocodone-Acetaminophen]     Bactrim Nausea And Vomiting     diarrhea    Dexlansoprazole Rash     And itching Protein, UA 30 (A) NEGATIVE MG/DL    Urobilinogen, Urine NORMAL 0.2 - 1.0 MG/DL    Nitrite Urine, Quantitative NEGATIVE NEGATIVE    Leukocyte Esterase, Urine MODERATE (A) NEGATIVE    RBC, UA 1 0 - 6 /HPF    WBC, UA 8 (H) 0 - 5 /HPF    Bacteria, UA RARE (A) NEGATIVE /HPF    Squam Epithel, UA <1 /HPF    Trans Epithel, UA <1 /HPF    Mucus, UA RARE (A) NEGATIVE HPF    Trichomonas, UA NONE SEEN NONE SEEN /HPF   Lactic Acid, Plasma   Result Value Ref Range    Lactate 1.7 0.4 - 2.0 mMOL/L   POC Blood Glucose   Result Value Ref Range    Glucose 120 mg/dL    QC OK? y    POCT Glucose   Result Value Ref Range    POC Glucose 120 (H) 70 - 99 MG/DL   EKG 12 Lead   Result Value Ref Range    Ventricular Rate 64 BPM    Atrial Rate 64 BPM    P-R Interval 174 ms    QRS Duration 88 ms    Q-T Interval 406 ms    QTc Calculation (Bazett) 418 ms    P Axis -14 degrees    R Axis -39 degrees    T Axis 6 degrees    Diagnosis       Normal sinus rhythm  Left axis deviation  Possible Anterior infarct , age undetermined  Abnormal ECG  When compared with ECG of 17-DEC-2019 08:03,  Nonspecific T wave abnormality no longer evident in Lateral leads        Radiographs (if obtained):  [] The following radiograph was interpreted by myself in the absence of a radiologist:  [x]Radiologist's Report Reviewed:  No orders to display         EKG (if obtained): (All EKG's are interpreted by myself in the absence of a cardiologist)  Normal sinus rhythm with a rate of 64. CO interval 174, QRS 88, QTc 418. No ST elevations or depressions. Normal T waves. Left axis deviation. Impression: Abnormal EKG. When compared to previous EKG from 12/17/2019, there are no significant changes. MDM:  Differential diagnoses considered include but are not limited to episodes of hypoglycemia, nausea, gastritis, gastroenteritis, peptic ulcer disease, urinary tract infection. Accu-Chek shows a normal glucose. Basic labs were obtained and are unremarkable.   EKG is not concerning for acute ischemia and troponin is negative, do not suspect acute coronary syndrome causing patient symptoms. Lipase is normal, I do not suspect anchor otitis. Lactic acid level is normal.  Patient's urine does have moderate leukocytes and some bacteria. I am concerned for a possible urinary tract infection. We will send this for culture and start patient on Keflex for possible urinary tract infection. We will discharge her home in stable condition as she has normal vital signs, normal labs and benign abdominal exam.  I do not suspect an emergent cause of her symptoms. I did recommend close follow-up with her primary care physician. Plan of care explained to patient. Concerning signs and symptoms warranting a return visit to the Emergency Department, such as fever, intractable vomiting and inability to urinate, were explained in detail. All questions and concerns were addressed to the patient's satisfaction. Patient understood and agreed with plan. I did don appropriate PPE (including N95 face mask, protective eye ware/safety glasses, gloves, hair covering, and no isolation gown), as recommended by the health facility/national standard best practice, during my bedside interactions with the patient. The likelihood of other entities in the differential is insufficient to justify any further testing for them. This was explained to the patient. The patient was advised that persistent or worsening symptoms would requirefurther evaluation. Clinical Impression:  1. Urinary tract infection without hematuria, site unspecified    2. Nausea          Angy Mejia MD       Please note that portions of this note may have been complete with a voice recognition program.  Effortswere made to edit the dictations, but occasional words are mis-transcribed.           Angy Mejia MD  09/05/20 [id number]

## 2020-09-05 NOTE — ED NOTES
Discharge instructions and follow up reviewed with patient, voiced understanding.      Abigail Gutierrez RN  09/05/20 0391

## 2020-09-08 LAB
CULTURE: NORMAL
Lab: NORMAL
SPECIMEN: NORMAL

## 2020-09-09 LAB
EKG ATRIAL RATE: 64 BPM
EKG DIAGNOSIS: NORMAL
EKG P AXIS: -14 DEGREES
EKG P-R INTERVAL: 174 MS
EKG Q-T INTERVAL: 406 MS
EKG QRS DURATION: 88 MS
EKG QTC CALCULATION (BAZETT): 418 MS
EKG R AXIS: -39 DEGREES
EKG T AXIS: 6 DEGREES
EKG VENTRICULAR RATE: 64 BPM

## 2020-11-30 ENCOUNTER — OFFICE VISIT (OUTPATIENT)
Dept: CARDIOLOGY CLINIC | Age: 85
End: 2020-11-30
Payer: MEDICARE

## 2020-11-30 VITALS
RESPIRATION RATE: 16 BRPM | BODY MASS INDEX: 29.67 KG/M2 | WEIGHT: 184.6 LBS | SYSTOLIC BLOOD PRESSURE: 138 MMHG | HEIGHT: 66 IN | HEART RATE: 80 BPM | DIASTOLIC BLOOD PRESSURE: 80 MMHG

## 2020-11-30 PROCEDURE — 99215 OFFICE O/P EST HI 40 MIN: CPT | Performed by: INTERNAL MEDICINE

## 2020-11-30 PROCEDURE — G8417 CALC BMI ABV UP PARAM F/U: HCPCS | Performed by: INTERNAL MEDICINE

## 2020-11-30 PROCEDURE — G8484 FLU IMMUNIZE NO ADMIN: HCPCS | Performed by: INTERNAL MEDICINE

## 2020-11-30 PROCEDURE — 1090F PRES/ABSN URINE INCON ASSESS: CPT | Performed by: INTERNAL MEDICINE

## 2020-11-30 PROCEDURE — 4040F PNEUMOC VAC/ADMIN/RCVD: CPT | Performed by: INTERNAL MEDICINE

## 2020-11-30 PROCEDURE — 1123F ACP DISCUSS/DSCN MKR DOCD: CPT | Performed by: INTERNAL MEDICINE

## 2020-11-30 PROCEDURE — 1036F TOBACCO NON-USER: CPT | Performed by: INTERNAL MEDICINE

## 2020-11-30 PROCEDURE — G8427 DOCREV CUR MEDS BY ELIG CLIN: HCPCS | Performed by: INTERNAL MEDICINE

## 2020-11-30 NOTE — PROGRESS NOTES
Pain. .      Cholecalciferol (VITAMIN D3) 5000 UNITS TABS Take 1 each by mouth daily       magnesium oxide (MAG-OX) 400 MG tablet Take 400 mg by mouth daily.  Glucose Blood (BLOOD GLUCOSE TEST STRIPS) STRP Test strips for Contour glucometer. Tests once daily. 50 strip 6    naproxen (NAPROSYN) 500 MG tablet Take 1 tablet by mouth 2 times daily as needed for Pain 20 tablet 0    simvastatin (ZOCOR) 20 MG tablet Take 1 tablet by mouth nightly 90 tablet 3    amLODIPine (NORVASC) 2.5 MG tablet Take 1 tablet by mouth daily (Patient taking differently: Take 5 mg by mouth daily ) 90 tablet 3     No current facility-administered medications for this visit. Allergies: Codeine; Demerol; Hydrocodone-acetaminophen; Iodine; Metformin; Metformin and related [metformin and related]; Vicodin [hydrocodone-acetaminophen]; Bactrim; and Dexlansoprazole  Past Medical History:   Diagnosis Date    Allergic rhinitis     CAD (coronary artery disease)     Chronic nausea     seen GI in the past with EGD that was normal     GERD (gastroesophageal reflux disease)     H/O cardiovascular stress test 11/19/2014    moderate to sverere ischemia apical inferior, transient ischemic dilation ,EF70%    H/O cardiovascular stress test 1/28/2015    cardiolite-normal,EF70%    H/O cardiovascular stress test 4/16/2015    lexiscan-normal,EF70%    H/O Doppler lower venous ultrasound 09/06/2019     No significant reflux noted in the veins of the bilateral lower extremities. No DVT or SVT.    H/O echocardiogram 11/19/14    EF 55%.  H/O echocardiogram 11/29/2018    EF: 55-60% Mild AR, TR, Mild pulm HTN at 37 mmHg     H/O percutaneous left heart catheterization 12/10/14    Left ventricular end diastolic pressure was 1 mmHg, Left ventriculography revealed an,  2. EF around 50%, The left main is normal, The left anterior descending artery has 99%  mid segment stenosis and mildly aneursymal appearance.  The left circumflex is mild  disease, The right coronary artery is mild disease    H/O right coronary artery stent placement 12/10/14    Successful stenting of the mid LAD with reduction of stenosis from 99% to 0%    History of echocardiogram 11/03/2016     EF 50-60%·. This 2D echocardiogram is normal, except for mild LVH     Hypertension     Intermittent palpitations     Need for pneumococcal vaccine     last screen  2004 and had stopped screening    Obesity (BMI 30-39. 9)     Other screening mammogram     Type II or unspecified type diabetes mellitus without mention of complication, not stated as uncontrolled     April 2011 Dr. Brenna Andrew- normal DM exam, podiatry Dr. Guru Forrester- seen yearly     Past Surgical History:   Procedure Laterality Date    CARDIAC CATHETERIZATION  12/10/14    Left ventricular end diastolic pressure was 1 mmHg, Left ventriculography revealed an,  2. EF around 50%, The left main is normal, The left anterior descending artery has 99%  mid segment stenosis and mildly aneursymal appearance.  The left circumflex is mild  disease, The right coronary artery is mild disease    CATARACT REMOVAL  4/2011    no diabetic retinopathy; due in 1 year    CORONARY ANGIOPLASTY WITH STENT PLACEMENT  12/10/14    Successful stenting of the mid LAD with reduction of stenosis from 99% to 0%    EYE SURGERY      left eye    KNEE ARTHROSCOPY      SHOULDER SURGERY Right     TONSILLECTOMY       Family History   Problem Relation Age of Onset    Cancer Sister     Heart Attack Brother      Social History     Tobacco Use    Smoking status: Never Smoker    Smokeless tobacco: Never Used   Substance Use Topics    Alcohol use: No     Alcohol/week: 0.0 standard drinks     Comment: caffeine 1 cup of coffee a day      [unfilled]  Review of Systems:   · Constitutional: No Fever or Weight Loss   · Eyes: No Decreased Vision  · ENT: No Headaches, Hearing Loss or Vertigo  · Cardiovascular: No chest pain, dyspnea on exertion, palpitations or loss of consciousness  · Respiratory: No cough or wheezing    · Gastrointestinal: No abdominal pain, appetite loss, blood in stools, constipation, diarrhea or heartburn  · Genitourinary: No dysuria, trouble voiding, or hematuria  · Musculoskeletal:  No gait disturbance, weakness or joint complaints  · Integumentary: No rash or pruritis  · Neurological: No TIA or stroke symptoms  · Psychiatric: No anxiety or depression  · Endocrine: No malaise, fatigue or temperature intolerance  · Hematologic/Lymphatic: No bleeding problems, blood clots or swollen lymph nodes  · Allergic/Immunologic: No nasal congestion or hives  All systems negative except as marked. Objective:  /80 (Site: Left Upper Arm, Position: Sitting, Cuff Size: Medium Adult)   Pulse 80   Resp 16   Ht 5' 6\" (1.676 m)   Wt 184 lb 9.6 oz (83.7 kg)   BMI 29.80 kg/m²   Wt Readings from Last 3 Encounters:   11/30/20 184 lb 9.6 oz (83.7 kg)   09/05/20 190 lb (86.2 kg)   05/28/20 191 lb (86.6 kg)     Body mass index is 29.8 kg/m². GENERAL - Alert, oriented, pleasant, in no apparent distress,normal grooming  HEENT - pupils are reactive to light and accomodation, cornea intact, conjunctive normal color, ears are normal in exam,throat exam in normal, teeth, gum and palate are normal, oral mucosa is normal without any notation of pallor or cyanosis  Neck - Supple. No jugular venous distention noted. No carotid bruits, no apical -carotid delay  Respiratory:  Normal breath sounds, No respiratory distress, No wheezing, No chest tenderness. ,no use of accessory muscles, diaphragm movement is normal  Cardiovascular: (PMI) apex non displaced,no lifts no thrills, no s3,no s4, Normal heart rate, Normal rhythm, No murmurs, No rubs, No gallops.  Carotid arteries pulse and amplitude are normal no bruit, no abdominal bruit noted ( normal abdominal aorta ausculation), femoral arteries pulse and amplitude are normal no bruit, pedal pulses are normal  Femoral pulses have normal amplitude, no bruits   Extremities - No cyanosis, clubbing, or significant edema, no varicose veins    Abdomen - No masses, tenderness, or organomegaly, no hepato-splenomegally, no bruits  Musculoskeletal - No significant edema, no kyphosis or scoliosis, no deformity in any extremity noted, muscle strength and tone are normal  Skin: no ulcer,no scar,no stasis dermatitis   Neurologic - alert oriented times 3,Cranial nerves II through XII are grossly intact. There were no gross focal neurologic abnormalities. All sensory and motor nerves examined and were normal  Psychiatric: normal mood and affect    Lab Results   Component Value Date    CKTOTAL 111 12/17/2019     BNP:  No results found for: BNP  PT/INR:  No results found for: PTINR  Lab Results   Component Value Date    LABA1C 7.4 (H) 01/10/2018    LABA1C 6.6 (H) 06/17/2015     Lab Results   Component Value Date    CHOL 155 01/10/2018    TRIG 89 01/10/2018    HDL 66 01/10/2018    LDLDIRECT 78 01/10/2018     Lab Results   Component Value Date    ALT 16 09/05/2020    AST 21 09/05/2020     TSH:  No results found for: TSH    Impression:  Ted Dwyer is a 80 y. o.year old who  has a past medical history of Allergic rhinitis, CAD (coronary artery disease), Chronic nausea, GERD (gastroesophageal reflux disease), H/O cardiovascular stress test, H/O cardiovascular stress test, H/O cardiovascular stress test, H/O Doppler lower venous ultrasound, H/O echocardiogram, H/O echocardiogram, H/O percutaneous left heart catheterization, H/O right coronary artery stent placement, History of echocardiogram, Hypertension, Intermittent palpitations, Need for pneumococcal vaccine, Obesity (BMI 30-39.9), Other screening mammogram, and Type II or unspecified type diabetes mellitus without mention of complication, not stated as uncontrolled. and presents with     Plan:  1. Sweating: recommend to see PMD  2. CAD:h/o LAD PCI 2014,Continue aspirin , continue statins,betablockers, off plavix  3.  Leg swelling: venous doppler is wnl, leg swelling is better. 4. Shortness of breath: resolved  5. DM: stable. Stopped  Glipizide on acarbose  6. Dyslipidemia: continue statins. 7. HTN: stable, continue lopressor, norvasc and off hyzaar  8. Sweating: better since she stopped oxybutinin Health maintenance: exerise and diet  All labs, medications and tests reviewed, continue all other medications of all above medical condition listed as is.     @[facility]@

## 2021-03-02 ENCOUNTER — HOSPITAL ENCOUNTER (OUTPATIENT)
Age: 86
Discharge: HOME | End: 2021-03-02
Payer: MEDICARE

## 2021-03-02 LAB
ALBUMIN SERPL-MCNC: 4.1 GM/DL (ref 3.4–5)
ANION GAP SERPL CALCULATED.3IONS-SCNC: 10 MMOL/L (ref 4–16)
BUN BLDV-MCNC: 24 MG/DL (ref 6–23)
CALCIUM SERPL-MCNC: 9.6 MG/DL (ref 8.3–10.6)
CHLORIDE BLD-SCNC: 104 MMOL/L (ref 99–110)
CO2: 27 MMOL/L (ref 21–32)
CREAT SERPL-MCNC: 1.2 MG/DL (ref 0.6–1.1)
CREATININE URINE: 99.6 MG/DL (ref 28–217)
GFR AFRICAN AMERICAN: 51 ML/MIN/1.73M2
GFR NON-AFRICAN AMERICAN: 42 ML/MIN/1.73M2
GLUCOSE BLD-MCNC: 93 MG/DL (ref 70–99)
PHOSPHORUS: 3.7 MG/DL (ref 2.5–4.9)
POTASSIUM SERPL-SCNC: 4.2 MMOL/L (ref 3.5–5.1)
PROT/CREAT RATIO, UR: 0.8
SODIUM BLD-SCNC: 141 MMOL/L (ref 135–145)
URINE TOTAL PROTEIN: 77 MG/DL

## 2021-03-02 PROCEDURE — 82570 ASSAY OF URINE CREATININE: CPT

## 2021-03-02 PROCEDURE — 36415 COLL VENOUS BLD VENIPUNCTURE: CPT

## 2021-03-02 PROCEDURE — 80069 RENAL FUNCTION PANEL: CPT

## 2021-03-02 PROCEDURE — 84156 ASSAY OF PROTEIN URINE: CPT

## 2021-03-03 PROBLEM — N18.31 STAGE 3A CHRONIC KIDNEY DISEASE (HCC): Status: ACTIVE | Noted: 2021-03-03

## 2021-05-24 ENCOUNTER — OFFICE VISIT (OUTPATIENT)
Dept: CARDIOLOGY CLINIC | Age: 86
End: 2021-05-24
Payer: MEDICARE

## 2021-05-24 VITALS
BODY MASS INDEX: 28.63 KG/M2 | HEIGHT: 67 IN | WEIGHT: 182.4 LBS | SYSTOLIC BLOOD PRESSURE: 134 MMHG | HEART RATE: 60 BPM | DIASTOLIC BLOOD PRESSURE: 86 MMHG

## 2021-05-24 DIAGNOSIS — I25.10 CORONARY ARTERY DISEASE INVOLVING NATIVE CORONARY ARTERY OF NATIVE HEART WITHOUT ANGINA PECTORIS: Primary | ICD-10-CM

## 2021-05-24 PROCEDURE — 99214 OFFICE O/P EST MOD 30 MIN: CPT | Performed by: INTERNAL MEDICINE

## 2021-05-24 NOTE — PATIENT INSTRUCTIONS
Please be informed that if you contact our office outside of normal business hours the physician on call cannot help with any scheduling or rescheduling issues, procedure instruction questions or any type of medication issue. We advise you for any urgent/emergency that you go to the nearest emergency room!     PLEASE CALL OUR OFFICE DURING NORMAL BUSINESS HOURS    Monday - Friday   8 am to 5 pm    [facility] [address]: [phone]    [place]:  [phone]

## 2021-05-24 NOTE — PROGRESS NOTES
Cathy Short MD        OFFICE  FOLLOWUP NOTE    Chief complaints: patient is here for management of CAD, HTN, DM, DYSLIPIDEMIA    Subjective: patient feels better, no chest pain, no shortness of breath, no dizziness, no palpitations    HARJINDER Yeung is a 80 y. o.year old who  has a past medical history of Allergic rhinitis, CAD (coronary artery disease), Chronic nausea, GERD (gastroesophageal reflux disease), H/O cardiovascular stress test, H/O cardiovascular stress test, H/O cardiovascular stress test, H/O Doppler lower venous ultrasound, H/O echocardiogram, H/O echocardiogram, H/O percutaneous left heart catheterization, H/O right coronary artery stent placement, History of echocardiogram, Hypertension, Intermittent palpitations, Need for pneumococcal vaccine, Obesity (BMI 30-39.9), Other screening mammogram, and Type II or unspecified type diabetes mellitus without mention of complication, not stated as uncontrolled.  and presents for management of  CAD, HTN, DM, DYSLIPIDEMIA, which are well controlled      Current Outpatient Medications   Medication Sig Dispense Refill    ondansetron (ZOFRAN ODT) 4 MG disintegrating tablet Take 1 tablet by mouth every 8 hours as needed for Nausea 15 tablet 0    Compression Stockings MISC by Does not apply route 20 - 30 mmh Wear Daily Can Take Off At Night  Thigh High 1 each 0    aspirin (ASPIRIN LOW DOSE) 81 MG EC tablet Take 1 tablet by mouth daily 30 tablet 5    metoprolol succinate (TOPROL XL) 25 MG extended release tablet Take 25 mg by mouth daily      oxybutynin (DITROPAN) 5 MG tablet Take 5 mg by mouth 3 times daily      Omega-3 Fatty Acids (OMEGA 3 500 PO) Take by mouth      Probiotic Product (PROBIOTIC DAILY PO) Take by mouth      naproxen (NAPROSYN) 500 MG tablet Take 1 tablet by mouth 2 times daily as needed for Pain 20 tablet 0    acarbose (PRECOSE) 25 MG tablet Take 25 mg by mouth 2 times daily      simvastatin (ZOCOR) 20 MG tablet Take 1 consciousness  · Respiratory: No cough or wheezing    · Gastrointestinal: No abdominal pain, appetite loss, blood in stools, constipation, diarrhea or heartburn  · Genitourinary: No dysuria, trouble voiding, or hematuria  · Musculoskeletal:  No gait disturbance, weakness or joint complaints  · Integumentary: No rash or pruritis  · Neurological: No TIA or stroke symptoms  · Psychiatric: No anxiety or depression  · Endocrine: No malaise, fatigue or temperature intolerance  · Hematologic/Lymphatic: No bleeding problems, blood clots or swollen lymph nodes  · Allergic/Immunologic: No nasal congestion or hives  All systems negative except as marked. Objective:  /86   Pulse 60   Ht 5' 7\" (1.702 m)   Wt 182 lb 6.4 oz (82.7 kg)   BMI 28.57 kg/m²   Wt Readings from Last 3 Encounters:   05/24/21 182 lb 6.4 oz (82.7 kg)   03/03/21 179 lb (81.2 kg)   11/30/20 184 lb 9.6 oz (83.7 kg)     Body mass index is 28.57 kg/m². GENERAL - Alert, oriented, pleasant, in no apparent distress,normal grooming  HEENT - pupils are intact, cornea intact, conjunctive normal color, ears are normal in exam,  Neck - Supple. No jugular venous distention noted. No carotid bruits, no apical -carotid delay  Respiratory:  Normal breath sounds, No respiratory distress, No wheezing, No chest tenderness. ,no use of accessory muscles, diaphragm movement is normal  Cardiovascular: (PMI) apex non displaced,no lifts no thrills, no s3,no s4, Normal heart rate, Normal rhythm, No murmurs, No rubs, No gallops.  Carotid arteries pulse and amplitude are normal no bruit, no abdominal bruit noted ( normal abdominal aorta ausculation),   Extremities - No cyanosis, clubbing, or significant edema, no varicose veins    Abdomen - No masses, tenderness, or organomegaly, no hepato-splenomegally, no bruits  Musculoskeletal - No significant edema, no kyphosis or scoliosis, no deformity in any extremity noted, muscle strength and tone are normal  Skin: no ulcer,no scar,no stasis dermatitis   Neurologic - alert oriented times 3,Cranial nerves II through XII are grossly intact. There were no gross focal neurologic abnormalities. Psychiatric: normal mood and affect    Lab Results   Component Value Date    CKTOTAL 111 12/17/2019     BNP:  No results found for: BNP  PT/INR:  No results found for: PTINR  Lab Results   Component Value Date    LABA1C 7.4 (H) 01/10/2018    LABA1C 6.6 (H) 06/17/2015     Lab Results   Component Value Date    CHOL 155 01/10/2018    TRIG 89 01/10/2018    HDL 66 01/10/2018    LDLDIRECT 78 01/10/2018     Lab Results   Component Value Date    ALT 16 09/05/2020    AST 21 09/05/2020     TSH:  No results found for: TSH    Impression:  Ela Arreaga is a 80 y. o.year old who  has a past medical history of Allergic rhinitis, CAD (coronary artery disease), Chronic nausea, GERD (gastroesophageal reflux disease), H/O cardiovascular stress test, H/O cardiovascular stress test, H/O cardiovascular stress test, H/O Doppler lower venous ultrasound, H/O echocardiogram, H/O echocardiogram, H/O percutaneous left heart catheterization, H/O right coronary artery stent placement, History of echocardiogram, Hypertension, Intermittent palpitations, Need for pneumococcal vaccine, Obesity (BMI 30-39.9), Other screening mammogram, and Type II or unspecified type diabetes mellitus without mention of complication, not stated as uncontrolled. and presents with     Plan: patient does not look to be 80 yrs old, she is in very good shape  1. CAD:h/o LAD PCI 2014,Continue aspirin , continue statins,betablockers, off plavix  2. Leg swelling: venous doppler is wnl, leg swelling is better. 3. Shortness of breath: resolved  4. DM: stable. Stopped  Glipizide on acarbose  5. Dyslipidemia: continue statins. 6. HTN: stable, continue lopressor, norvasc and off hyzaar  7.  Sweating: better since she stopped oxybutinin Health maintenance  All labs, medications and tests reviewed, continue all other medications of all above medical condition listed as is.     [unfilled]

## 2021-05-24 NOTE — LETTER
[address]  6/26/1928  [id number]    Have you had any Chest Pain that is not new? - No  If Yes DO EKG - How does it feel -    How long does the pain last -      How long have you been having the pain -    Did you take a    And did it relieve the pain -      DO EKG IF: Patient has a Heart Rate above 100 or below 40     CAD (Coronary Artery Disease) patient should have one on file every 6 months        Have you had any Shortness of Breath - YES  If Yes - When on exertion    Have you had any dizziness - No  If Yes DO ORTHOSTATIC BP - when do you feel dizzy    How long does it last .        Sitting wait 5 minutes do supine (laying down) wait 5 minutes then do standing - log each in \"vitals\" area in Epic   Be sure to ask what symptoms they are having if they get dizzy while completing ortho stats such as: room spinning, nausea, etc.    Have you had any palpitations that are not new? - No  If Yes DO EKG - Do you feel your heart   How long does it last - . Is the patient on any of the following medications -   If Yes DO EKG - Needs done every 6 months    Do you have any edema - swelling in No    If Yes - CHECK TO SEE IF THE EDEMA IS PITTING  How long have they been having edema -   If Yes - Have they worn compression stockings   If they have worn compression stockings      Vein \"LEG PROBLEM Questionnaire\"  1. Do you have prominent leg veins? 2. Do you have any skin discoloration? 3. Do you have any healed or active sores? o you have swelling of the legs? 4. Do you have a family history of varicose veins? 5. Does your profession involve pro-longed        standing or heavy lifting? 7. Have you been fighting overweight problems? 8. Do you have restless legs? 9. Do you have any night time cramps?     10. Do you have any of the following in your legs:             When did you have your last labs drawn   Where did you have them done What doctor ordered     If we do not have these labs you are retrieve these labs for these providers! Do you have a surgery or procedure scheduled in the near future - No  If Yes- DO EKG  If Yes - Who is the surgery with?    Phone number of physician   Fax number of physician   Type of surgery   GIVE THIS INFORMATION TO KAUSHAL THOMAS     Ask patient if they want to sign up for MyChart if they are not already signed up     Check to see if we have an E-MAIL on file for the patient     Check medication list thoroughly!!! AND RECONCILE OUTSIDE MEDICATIONS  If dose has changed change the entire order not just the [place] At check out add to every patient's \"wrap up\" the following dot phrase AFTERHOURSEDUCATION and ensure we explain this to our patients

## 2021-05-24 NOTE — LETTER
[address]  [address] [id number]  Phone: [phone]  Fax: [phone]    Shalom Cabrera MD    May 24, 2021     Uche Washburn MD  [facility] [id number]    Patient: Jackie Hurst   MR Number: [id number]   YOB: 1928   Date of Visit: 5/24/2021       Dear Uche Washburn: Thank you for referring Krystian Triana to me for evaluation/treatment. Below are the relevant portions of my assessment and plan of care. If you have questions, please do not hesitate to call me. I look forward to following Alyse Givens along with you.     Sincerely,        Shalom Cabrera MD

## 2021-09-07 ENCOUNTER — HOSPITAL ENCOUNTER (OUTPATIENT)
Age: 86
Discharge: HOME | End: 2021-09-07
Payer: MEDICARE

## 2021-09-07 DIAGNOSIS — N18.31 STAGE 3A CHRONIC KIDNEY DISEASE (HCC): ICD-10-CM

## 2021-09-07 LAB
ALBUMIN SERPL-MCNC: 4 GM/DL (ref 3.4–5)
ANION GAP SERPL CALCULATED.3IONS-SCNC: 13 MMOL/L (ref 4–16)
BUN BLDV-MCNC: 23 MG/DL (ref 6–23)
CALCIUM SERPL-MCNC: 9.6 MG/DL (ref 8.3–10.6)
CHLORIDE BLD-SCNC: 108 MMOL/L (ref 99–110)
CO2: 21 MMOL/L (ref 21–32)
CREAT SERPL-MCNC: 1.1 MG/DL (ref 0.6–1.1)
CREATININE URINE: 261.8 MG/DL (ref 28–217)
GFR AFRICAN AMERICAN: 56 ML/MIN/1.73M2
GFR NON-AFRICAN AMERICAN: 46 ML/MIN/1.73M2
GLUCOSE BLD-MCNC: 107 MG/DL (ref 70–99)
PHOSPHORUS: 2.9 MG/DL (ref 2.5–4.9)
POTASSIUM SERPL-SCNC: 4 MMOL/L (ref 3.5–5.1)
PROT/CREAT RATIO, UR: 0.4
SODIUM BLD-SCNC: 142 MMOL/L (ref 135–145)
URINE TOTAL PROTEIN: 100.2 MG/DL

## 2021-09-07 PROCEDURE — 82570 ASSAY OF URINE CREATININE: CPT

## 2021-09-07 PROCEDURE — 80069 RENAL FUNCTION PANEL: CPT

## 2021-09-07 PROCEDURE — 84156 ASSAY OF PROTEIN URINE: CPT

## 2021-09-07 PROCEDURE — 36415 COLL VENOUS BLD VENIPUNCTURE: CPT

## 2022-05-12 LAB
AMBIGUOUS ABBREVIATION: NORMAL
AMBIGUOUS ABBREVIATION: NORMAL
BUN / CREAT RATIO: 21 (ref 12–28)
BUN / CREAT RATIO: 21 (ref 12–28)
BUN BLDV-MCNC: 24 MG/DL (ref 10–36)
BUN BLDV-MCNC: 24 MG/DL (ref 10–36)
CALCIUM SERPL-MCNC: 9.7 MG/DL (ref 8.7–10.3)
CALCIUM SERPL-MCNC: 9.7 MG/DL (ref 8.7–10.3)
CHLORIDE BLD-SCNC: 108 MMOL/L (ref 96–106)
CHLORIDE BLD-SCNC: 108 MMOL/L (ref 96–106)
CHOLESTEROL, TOTAL: 180 MG/DL (ref 100–199)
CHOLESTEROL, TOTAL: 180 MG/DL (ref 100–199)
CO2: 21 MMOL/L (ref 20–29)
CO2: 21 MMOL/L (ref 20–29)
COMMENT: NORMAL
CREAT SERPL-MCNC: 1.15 MG/DL (ref 0.57–1)
CREAT SERPL-MCNC: 1.15 MG/DL (ref 0.57–1)
EGFR (CKD-EPI): 44 ML/MIN/1.73
ESTIMATED GLOMERULAR FILTRATION RATE CREATININE EQUATION: 44 ML/MIN/1.73
GLUCOSE BLD-MCNC: 113 MG/DL (ref 65–99)
GLUCOSE BLD-MCNC: 113 MG/DL (ref 65–99)
HBA1C MFR BLD: 6.4 % (ref 4.8–5.6)
HBA1C MFR BLD: 6.4 % (ref 4.8–5.6)
HDLC SERPL-MCNC: 71 MG/DL
HDLC SERPL-MCNC: 71 MG/DL
LDL CHOLESTEROL CALCULATED: 97 MG/DL (ref 0–99)
LDL CHOLESTEROL CALCULATED: 97 MG/DL (ref 0–99)
POTASSIUM SERPL-SCNC: 4.2 MMOL/L (ref 3.5–5.2)
POTASSIUM SERPL-SCNC: 4.2 MMOL/L (ref 3.5–5.2)
SODIUM BLD-SCNC: 144 MMOL/L (ref 134–144)
SODIUM BLD-SCNC: 144 MMOL/L (ref 134–144)
SPECIMEN STATUS: NORMAL
TRIGL SERPL-MCNC: 66 MG/DL (ref 0–149)
TRIGL SERPL-MCNC: 66 MG/DL (ref 0–149)
VLDLC SERPL CALC-MCNC: 12 MG/DL (ref 5–40)
VLDLC SERPL CALC-MCNC: 12 MG/DL (ref 5–40)

## 2022-07-12 ENCOUNTER — HOSPITAL ENCOUNTER (OUTPATIENT)
Age: 87
Setting detail: SPECIMEN
Discharge: HOME | End: 2022-07-12
Payer: MEDICARE

## 2022-07-12 LAB
ALBUMIN SERPL-MCNC: 3.2 GM/DL (ref 3.4–5)
ALP BLD-CCNC: 90 IU/L (ref 40–128)
ALT SERPL-CCNC: 19 U/L (ref 10–40)
ANION GAP SERPL CALCULATED.3IONS-SCNC: 9 MMOL/L (ref 4–16)
AST SERPL-CCNC: 18 IU/L (ref 15–37)
BILIRUB SERPL-MCNC: 0.4 MG/DL (ref 0–1)
BUN BLDV-MCNC: 38 MG/DL (ref 6–23)
CALCIUM SERPL-MCNC: 8.8 MG/DL (ref 8.3–10.6)
CHLORIDE BLD-SCNC: 106 MMOL/L (ref 99–110)
CO2: 24 MMOL/L (ref 21–32)
CREAT SERPL-MCNC: 1.1 MG/DL (ref 0.6–1.1)
ESTIMATED AVERAGE GLUCOSE: 134 MG/DL
GFR AFRICAN AMERICAN: 56 ML/MIN/1.73M2
GFR NON-AFRICAN AMERICAN: 46 ML/MIN/1.73M2
GLUCOSE BLD-MCNC: 84 MG/DL (ref 70–99)
HBA1C MFR BLD: 6.3 % (ref 4.2–6.3)
HCT VFR BLD CALC: 38.3 % (ref 37–47)
HEMOGLOBIN: 11.2 GM/DL (ref 12.5–16)
MCH RBC QN AUTO: 29.7 PG (ref 27–31)
MCHC RBC AUTO-ENTMCNC: 29.2 % (ref 32–36)
MCV RBC AUTO: 101.6 FL (ref 78–100)
PDW BLD-RTO: 13.4 % (ref 11.7–14.9)
PLATELET # BLD: 225 K/CU MM (ref 140–440)
PMV BLD AUTO: 11.4 FL (ref 7.5–11.1)
POTASSIUM SERPL-SCNC: 4.1 MMOL/L (ref 3.5–5.1)
RBC # BLD: 3.77 M/CU MM (ref 4.2–5.4)
SODIUM BLD-SCNC: 139 MMOL/L (ref 135–145)
TOTAL PROTEIN: 6 GM/DL (ref 6.4–8.2)
WBC # BLD: 7.6 K/CU MM (ref 4–10.5)

## 2022-07-12 PROCEDURE — 36415 COLL VENOUS BLD VENIPUNCTURE: CPT

## 2022-07-12 PROCEDURE — 83036 HEMOGLOBIN GLYCOSYLATED A1C: CPT

## 2022-07-12 PROCEDURE — 85027 COMPLETE CBC AUTOMATED: CPT

## 2022-07-12 PROCEDURE — 80053 COMPREHEN METABOLIC PANEL: CPT

## 2022-08-06 ENCOUNTER — APPOINTMENT (OUTPATIENT)
Dept: CT IMAGING | Age: 87
End: 2022-08-06
Payer: MEDICARE

## 2022-08-06 ENCOUNTER — APPOINTMENT (OUTPATIENT)
Dept: GENERAL RADIOLOGY | Age: 87
End: 2022-08-06
Payer: MEDICARE

## 2022-08-06 ENCOUNTER — HOSPITAL ENCOUNTER (EMERGENCY)
Age: 87
Discharge: HOME | End: 2022-08-06
Payer: MEDICARE

## 2022-08-06 VITALS
WEIGHT: 160 LBS | TEMPERATURE: 98.1 F | OXYGEN SATURATION: 100 % | BODY MASS INDEX: 25.11 KG/M2 | RESPIRATION RATE: 18 BRPM | HEIGHT: 67 IN | SYSTOLIC BLOOD PRESSURE: 159 MMHG | HEART RATE: 52 BPM | DIASTOLIC BLOOD PRESSURE: 74 MMHG

## 2022-08-06 DIAGNOSIS — S32.009A CLOSED FRACTURE OF SPINOUS PROCESS OF LUMBAR VERTEBRA, INITIAL ENCOUNTER (HCC): ICD-10-CM

## 2022-08-06 DIAGNOSIS — S09.90XA INJURY OF HEAD, INITIAL ENCOUNTER: Primary | ICD-10-CM

## 2022-08-06 DIAGNOSIS — S22.079A CLOSED FRACTURE OF NINTH THORACIC VERTEBRA, UNSPECIFIED FRACTURE MORPHOLOGY, INITIAL ENCOUNTER (HCC): ICD-10-CM

## 2022-08-06 DIAGNOSIS — M25.561 ACUTE PAIN OF RIGHT KNEE: ICD-10-CM

## 2022-08-06 DIAGNOSIS — S01.01XA LACERATION OF SCALP, INITIAL ENCOUNTER: ICD-10-CM

## 2022-08-06 PROCEDURE — 90715 TDAP VACCINE 7 YRS/> IM: CPT | Performed by: PHYSICIAN ASSISTANT

## 2022-08-06 PROCEDURE — 72131 CT LUMBAR SPINE W/O DYE: CPT

## 2022-08-06 PROCEDURE — 72125 CT NECK SPINE W/O DYE: CPT

## 2022-08-06 PROCEDURE — 72128 CT CHEST SPINE W/O DYE: CPT

## 2022-08-06 PROCEDURE — 72170 X-RAY EXAM OF PELVIS: CPT

## 2022-08-06 PROCEDURE — 90471 IMMUNIZATION ADMIN: CPT | Performed by: PHYSICIAN ASSISTANT

## 2022-08-06 PROCEDURE — 99284 EMERGENCY DEPT VISIT MOD MDM: CPT

## 2022-08-06 PROCEDURE — 6360000002 HC RX W HCPCS: Performed by: PHYSICIAN ASSISTANT

## 2022-08-06 PROCEDURE — 71045 X-RAY EXAM CHEST 1 VIEW: CPT

## 2022-08-06 PROCEDURE — 70450 CT HEAD/BRAIN W/O DYE: CPT

## 2022-08-06 PROCEDURE — 73564 X-RAY EXAM KNEE 4 OR MORE: CPT

## 2022-08-06 RX ADMIN — TETANUS TOXOID, REDUCED DIPHTHERIA TOXOID AND ACELLULAR PERTUSSIS VACCINE, ADSORBED 0.5 ML: 5; 2.5; 8; 8; 2.5 SUSPENSION INTRAMUSCULAR at 20:33

## 2022-08-06 ASSESSMENT — PAIN - FUNCTIONAL ASSESSMENT: PAIN_FUNCTIONAL_ASSESSMENT: NONE - DENIES PAIN

## 2022-08-06 ASSESSMENT — LIFESTYLE VARIABLES: HOW MANY STANDARD DRINKS CONTAINING ALCOHOL DO YOU HAVE ON A TYPICAL DAY: PATIENT DOES NOT DRINK

## 2022-08-06 NOTE — ED PROVIDER NOTES
[address] [facility] COMPLAINT    Chief Complaint   Patient presents with    Fall    Head Injury       This patient was not evaluated by the attending physician. I have independently evaluated this patient. HPI    Bryson Montana is a 80 y.o. female who presents with a head injury with an onset prior to arrival.   The context (mechanism) was patient states she slipped and fell back hitting head. Patient has associated laceration. Patient also states she hit her right knee. Patient has associated mid back pain, denies low back pain or neck pain. Patient denies loss of consciousness, vomiting. Patient is unsure if she takes blood thinners. Patient is unsure of the last time she had a tetanus vaccination. Patient denies vision changes, syncope, chest pain or shortness of breath. REVIEW OF SYSTEMS    Constitutional:  Denies fever, chills  Neurologic:  See HPI. Eyes:  Denies dipplopia, blurred vision, or loss visual field. Denies discharge. Ears: no ear trauma or hearing changes  Musculoskeletal:  See HPI. Cardiac: No Chest Pain or Chest Injury  Respiratory:  Denies cough, shortness of breath, respiratory discomfort   GI: No vomiting.   No Abdominal pain or Abdominal Injury  : No Dysuria or Hematuria   Skin:  see HPI    All other review of systems are negative  See HPI and nursing notes for additional information         PAST MEDICAL & SURGICAL HISTORY    Past Medical History:   Diagnosis Date    Allergic rhinitis     CAD (coronary artery disease)     Chronic nausea     seen GI in the past with EGD that was normal     GERD (gastroesophageal reflux disease)     H/O cardiovascular stress test 11/19/2014    moderate to sverere ischemia apical inferior, transient ischemic dilation ,EF70%    H/O cardiovascular stress test 1/28/2015    cardiolite-normal,EF70%    H/O cardiovascular stress test 4/16/2015    lexiscan-normal,EF70%    H/O Doppler lower venous ultrasound 09/06/2019     No Outpatient Rx   Medication Sig Dispense Refill    ondansetron (ZOFRAN ODT) 4 MG disintegrating tablet Take 1 tablet by mouth every 8 hours as needed for Nausea 15 tablet 0    Compression Stockings MISC by Does not apply route 20 - 30 mmh Wear Daily Can Take Off At Night  Thigh High 1 each 0    aspirin (ASPIRIN LOW DOSE) 81 MG EC tablet Take 1 tablet by mouth daily 30 tablet 5    metoprolol succinate (TOPROL XL) 25 MG extended release tablet Take 25 mg by mouth daily      oxybutynin (DITROPAN) 5 MG tablet Take 5 mg by mouth 3 times daily      Omega-3 Fatty Acids (OMEGA 3 500 PO) Take by mouth      Probiotic Product (PROBIOTIC DAILY PO) Take by mouth      naproxen (NAPROSYN) 500 MG tablet Take 1 tablet by mouth 2 times daily as needed for Pain 20 tablet 0    acarbose (PRECOSE) 25 MG tablet Take 25 mg by mouth 2 times daily      simvastatin (ZOCOR) 20 MG tablet Take 1 tablet by mouth nightly 90 tablet 3    amLODIPine (NORVASC) 2.5 MG tablet Take 1 tablet by mouth daily (Patient taking differently: Take 5 mg by mouth daily ) 90 tablet 3    pantoprazole (PROTONIX) 40 MG tablet Take 40 mg by mouth daily. traMADol (ULTRAM) 50 MG tablet Take 50 mg by mouth every 8 hours as needed for Pain. .      Cholecalciferol (VITAMIN D3) 5000 UNITS TABS Take 1 each by mouth daily       magnesium oxide (MAG-OX) 400 MG tablet Take 400 mg by mouth daily. Glucose Blood (BLOOD GLUCOSE TEST STRIPS) STRP Test strips for Contour glucometer. Tests once daily.  50 strip 6       ALLERGIES    Allergies   Allergen Reactions    Codeine     Demerol     Hydrocodone-Acetaminophen     Iodine     Metformin Nausea Only     nausea    Metformin And Related [Metformin And Related] Nausea Only    Vicodin [Hydrocodone-Acetaminophen]     Bactrim Nausea And Vomiting     diarrhea    Dexlansoprazole Rash     And itching       SOCIAL & FAMILY HISTORY    Social History     Socioeconomic History    Marital status:      Spouse name: None    Number of children: None    Years of education: None    Highest education level: None   Occupational History    Occupation:       Comment: [employer] x 26 years   Tobacco Use    Smoking status: Never    Smokeless tobacco: Never   Vaping Use    Vaping Use: Never used   Substance and Sexual Activity    Alcohol use: No     Alcohol/week: 0.0 standard drinks     Comment: caffeine 1 cup of coffee a day    Drug use: No    Sexual activity: Not Currently   Social History Narrative    Lives along with her Dog     Family History   Problem Relation Age of Onset    Cancer Sister     Heart Attack Brother        PHYSICAL EXAM    VITAL SIGNS: BP (!) 163/69   Pulse 51   Temp 98.1 °F (36.7 °C) (Oral)   Resp 18   Ht 5' 7\" (1.702 m)   Wt 160 lb (72.6 kg)   SpO2 100%   BMI 25.06 kg/m²    Constitutional:  Well developed, well nourished, no acute distress   Scalp: Laceration to posterior scalp measuring approximately 1 cm. Neck:   No JVD    No swelling or discoloration on inspection. No posterior midline neck tenderness. Patient in c-collar. Back: Thoracic lumbar spine with no overlying erythema, ecchymosis, swelling. Mild midline tenderness to thoracic spine. No midline lumbar tenderness. Eyes: PERRL. EOMI. HENT:   No trismus, airway patent. No bleeding from ears nose or mouth. Respiratory:  Lungs Clear, no retractions   Cardiovascular: Normal rate and rhythm  GI:  Soft, nontender, normal bowel sounds  Musculoskeletal: Right knee with superficial abrasion to anterior aspect. Mild generalized tenderness to palpation. No varus or valgus laxity. Patient is able to flex and extend knee. Negative posterior and anterior drawer test.  No ankle or hip tenderness. Distal sensation and pulses intact. Integument: Approximately 1 cm laceration to posterior scalp. Abrasion to right knee.   Neurologic:    - Alert & oriented person, place, time, and situation, no speech difficulties or slurring.  - No obvious gross motor deficits  - Cranial nerves 2-12 grossly intact  - Sensation intact to light touch  - Strength 5/5 in upper and lower extremities bilaterally  Psych: Pleasant affect, no hallucinations      IMAGING:  XR KNEE RIGHT (MIN 4 VIEWS)   Final Result   No radiographic evidence of acute fracture. Small right knee effusion. Bilateral pulmonary infiltrates. Correlate for signs of infection and   recommend CT of the chest for further evaluation if warranted. XR CHEST PORTABLE   Final Result   No radiographic evidence of acute fracture. Small right knee effusion. Bilateral pulmonary infiltrates. Correlate for signs of infection and   recommend CT of the chest for further evaluation if warranted. XR PELVIS (1-2 VIEWS)   Final Result   No radiographic evidence of acute fracture. Small right knee effusion. Bilateral pulmonary infiltrates. Correlate for signs of infection and   recommend CT of the chest for further evaluation if warranted. CT LUMBAR SPINE WO CONTRAST   Final Result   1. Acute traumatic nondisplaced fracture involving the inferior inferior and   lateral aspect of the T9 vertebral body extending into the endplate. 2. Acute traumatic nondisplaced left transverse process fracture of L1.         CT THORACIC SPINE WO CONTRAST   Final Result   1. Acute traumatic nondisplaced fracture involving the inferior inferior and   lateral aspect of the T9 vertebral body extending into the endplate. 2. Acute traumatic nondisplaced left transverse process fracture of L1.         CT CERVICAL SPINE WO CONTRAST   Final Result   1. No acute intracranial abnormality. 2. No acute traumatic abnormality involving the cervical spine. CT HEAD WO CONTRAST   Final Result   1. No acute intracranial abnormality. 2. No acute traumatic abnormality involving the cervical spine. ED COURSE & MEDICAL DECISION MAKING     Patient presents as above.   Patient declines pain medication while in emergency department. Patient tetanus is updated. CT head shows no acute intracranial abnormality. CT cervical spine shows no acute osseous abnormality. Patient is cleared from c-collar. Posterior scalp laceration is cleaned with normal saline and Hibiclens. Laceration does not require repair, is well approximated without gaping and no significant bleeding. Right knee x-ray with small effusion, no acute osseous abnormality. Pelvic x-ray shows no acute osseous abnormality. Chest x-ray shows bilateral pulmonary infiltrates. Patient denies any cough, fever. Family member states she did recently recover from [place], denies chest pain or shortness of breath at this time. I suspect this is from recent [place] and does not require antibiotics at this time. CT thoracic and lumbar spine shows acute traumatic nondisplaced fracture involving T9 vertebral body, acute traumatic nondisplaced transverse process fracture of L1. Consult to neurosurgeon Dr. Jaime Pimentel who recommends outpatient follow-up, does not currently require brace if pain is controlled with standing. Patient is able to  emergency department without pain. Patient provided Ace wrap for right knee. Patient states she has a walker at home. Patient is from nursing home and feels safe returning. Head injury, wound care instructions provided. Recommend follow-up with primary care provider in 2 days for recheck. Recommend follow-up with back specialist by calling to schedule appointment for evaluation. Clinical  IMPRESSION    1. Injury of head, initial encounter    2. Laceration of scalp, initial encounter    3. Acute pain of right knee    4. Closed fracture of ninth thoracic vertebra, unspecified fracture morphology, initial encounter ([address]    5. Closed fracture of spinous process of lumbar vertebra, initial encounter ([address]        Diagnosis and plan discussed in detail with patient who understands and agrees.   Return to emergency Department precautions, which included any change in nature or severity of symptoms, development of numbness/tingling, or weakness, or any new symptoms, were discussed in detail with patient who understands and agrees. Comment: Please note this report has been produced using speech recognition software and may contain errors related to that system including errors in grammar, punctuation, and spelling, as well as words and phrases that may be inappropriate. If there are any questions or concerns please feel free to contact the dictating provider for clarification.                Jessica Hui PA-C  08/06/22 2024

## 2022-08-06 NOTE — FLOWSHEET NOTE
08/06/22 1930   Vital Signs   Heart Rate 51   Resp 18   BP (!) 163/69   MAP (Calculated) 100.33   Level of Consciousness Alert (0)   Oxygen Therapy   SpO2 100 %   O2 Device None (Room air)

## 2022-08-06 NOTE — ED NOTES
Received patient to trauma room 2 via  Squad with C/O a fall times 2 today. Patient placed on cart and care assumed by the ED staff. Patient voices pain level 0/10. Patient placed in a position of comfort. Call bell within reach. Needs identified and completed.       Kina Schneider RN  08/06/22 [id number]

## 2022-11-03 ENCOUNTER — HOSPITAL ENCOUNTER (OUTPATIENT)
Age: 87
Setting detail: SPECIMEN
Discharge: HOME | End: 2022-11-03
Payer: MEDICARE

## 2022-11-03 LAB
BACTERIA: NEGATIVE /HPF
BILIRUBIN URINE: NEGATIVE MG/DL
BLOOD, URINE: NEGATIVE
CELLULAR CASTS: 1 /LPF
CLARITY: CLEAR
COLOR: YELLOW
GLUCOSE, URINE: NEGATIVE MG/DL
KETONES, URINE: NEGATIVE MG/DL
LEUKOCYTE ESTERASE, URINE: ABNORMAL
MUCUS: ABNORMAL HPF
NITRITE URINE, QUANTITATIVE: POSITIVE
PH, URINE: 5.5 (ref 5–8)
PROTEIN UA: NEGATIVE MG/DL
RBC URINE: 1 /HPF (ref 0–6)
SPECIFIC GRAVITY UA: 1.02 (ref 1–1.03)
SQUAMOUS EPITHELIAL: <1 /HPF
TRICHOMONAS: ABNORMAL /HPF
UROBILINOGEN, URINE: 0.2 MG/DL (ref 0.2–1)
WBC UA: 17 /HPF (ref 0–5)

## 2022-11-03 PROCEDURE — 87088 URINE BACTERIA CULTURE: CPT

## 2022-11-03 PROCEDURE — 87086 URINE CULTURE/COLONY COUNT: CPT

## 2022-11-03 PROCEDURE — 81001 URINALYSIS AUTO W/SCOPE: CPT

## 2022-11-03 PROCEDURE — 87186 SC STD MICRODIL/AGAR DIL: CPT

## 2022-11-05 LAB
CULTURE: ABNORMAL
CULTURE: ABNORMAL
Lab: ABNORMAL
SPECIMEN: ABNORMAL

## 2022-11-30 ENCOUNTER — HOSPITAL ENCOUNTER (OUTPATIENT)
Age: 87
Setting detail: SPECIMEN
Discharge: HOME | End: 2022-11-30
Payer: MEDICARE

## 2022-11-30 ENCOUNTER — HOSPITAL ENCOUNTER (OUTPATIENT)
Age: 87
Setting detail: SPECIMEN
Discharge: HOME | End: 2022-11-30

## 2022-11-30 LAB
RAPID INFLUENZA  B AGN: NEGATIVE
RAPID INFLUENZA A AGN: NEGATIVE

## 2022-11-30 PROCEDURE — 9900360100 HC STAT COLLECTION FEE SNF

## 2022-11-30 PROCEDURE — 87804 INFLUENZA ASSAY W/OPTIC: CPT

## 2022-12-29 ENCOUNTER — HOSPITAL ENCOUNTER (OUTPATIENT)
Age: 87
Setting detail: SPECIMEN
Discharge: HOME | End: 2022-12-29

## 2022-12-29 LAB
ALBUMIN SERPL-MCNC: 3.5 GM/DL (ref 3.4–5)
ALP BLD-CCNC: 81 IU/L (ref 40–128)
ALT SERPL-CCNC: 12 U/L (ref 10–40)
ANION GAP SERPL CALCULATED.3IONS-SCNC: 10 MMOL/L (ref 4–16)
AST SERPL-CCNC: 22 IU/L (ref 15–37)
BASOPHILS ABSOLUTE: 0 K/CU MM
BASOPHILS RELATIVE PERCENT: 0.7 % (ref 0–1)
BILIRUB SERPL-MCNC: 0.3 MG/DL (ref 0–1)
BUN BLDV-MCNC: 28 MG/DL (ref 6–23)
CALCIUM SERPL-MCNC: 9.4 MG/DL (ref 8.3–10.6)
CHLORIDE BLD-SCNC: 108 MMOL/L (ref 99–110)
CO2: 24 MMOL/L (ref 21–32)
CREAT SERPL-MCNC: 1.1 MG/DL (ref 0.6–1.1)
DIFFERENTIAL TYPE: ABNORMAL
EOSINOPHILS ABSOLUTE: 0.3 K/CU MM
EOSINOPHILS RELATIVE PERCENT: 5.6 % (ref 0–3)
ESTIMATED AVERAGE GLUCOSE: 134 MG/DL
GFR SERPL CREATININE-BSD FRML MDRD: 47 ML/MIN/1.73M2
GLUCOSE BLD-MCNC: 103 MG/DL (ref 70–99)
HBA1C MFR BLD: 6.3 % (ref 4.2–6.3)
HCT VFR BLD CALC: 35.9 % (ref 37–47)
HEMOGLOBIN: 11.3 GM/DL (ref 12.5–16)
IMMATURE NEUTROPHIL %: 0.2 % (ref 0–0.43)
LYMPHOCYTES ABSOLUTE: 1.3 K/CU MM
LYMPHOCYTES RELATIVE PERCENT: 20.7 % (ref 24–44)
MCH RBC QN AUTO: 29.3 PG (ref 27–31)
MCHC RBC AUTO-ENTMCNC: 31.5 % (ref 32–36)
MCV RBC AUTO: 93 FL (ref 78–100)
MONOCYTES ABSOLUTE: 0.7 K/CU MM
MONOCYTES RELATIVE PERCENT: 11.3 % (ref 0–4)
NUCLEATED RBC %: 0 %
PDW BLD-RTO: 13 % (ref 11.7–14.9)
PLATELET # BLD: 214 K/CU MM (ref 140–440)
PMV BLD AUTO: 11.4 FL (ref 7.5–11.1)
POTASSIUM SERPL-SCNC: 4.1 MMOL/L (ref 3.5–5.1)
RBC # BLD: 3.86 M/CU MM (ref 4.2–5.4)
SEGMENTED NEUTROPHILS ABSOLUTE COUNT: 3.7 K/CU MM
SEGMENTED NEUTROPHILS RELATIVE PERCENT: 61.5 % (ref 36–66)
SODIUM BLD-SCNC: 142 MMOL/L (ref 135–145)
TOTAL IMMATURE NEUTOROPHIL: 0.01 K/CU MM
TOTAL NUCLEATED RBC: 0 K/CU MM
TOTAL PROTEIN: 6.4 GM/DL (ref 6.4–8.2)
WBC # BLD: 6.1 K/CU MM (ref 4–10.5)

## 2022-12-29 PROCEDURE — 83036 HEMOGLOBIN GLYCOSYLATED A1C: CPT

## 2022-12-29 PROCEDURE — 36415 COLL VENOUS BLD VENIPUNCTURE: CPT

## 2022-12-29 PROCEDURE — 85025 COMPLETE CBC W/AUTO DIFF WBC: CPT

## 2022-12-29 PROCEDURE — 80053 COMPREHEN METABOLIC PANEL: CPT
